# Patient Record
Sex: FEMALE | Race: WHITE | NOT HISPANIC OR LATINO | Employment: OTHER | URBAN - METROPOLITAN AREA
[De-identification: names, ages, dates, MRNs, and addresses within clinical notes are randomized per-mention and may not be internally consistent; named-entity substitution may affect disease eponyms.]

---

## 2017-11-15 ENCOUNTER — OFFICE VISIT (OUTPATIENT)
Dept: URGENT CARE | Facility: PHYSICIAN GROUP | Age: 82
End: 2017-11-15
Payer: MEDICARE

## 2017-11-15 VITALS
TEMPERATURE: 98.6 F | HEIGHT: 59 IN | SYSTOLIC BLOOD PRESSURE: 168 MMHG | RESPIRATION RATE: 14 BRPM | OXYGEN SATURATION: 95 % | DIASTOLIC BLOOD PRESSURE: 90 MMHG | HEART RATE: 100 BPM | WEIGHT: 107 LBS | BODY MASS INDEX: 21.57 KG/M2

## 2017-11-15 DIAGNOSIS — L03.115 CELLULITIS OF RIGHT LOWER LEG: ICD-10-CM

## 2017-11-15 PROCEDURE — 99203 OFFICE O/P NEW LOW 30 MIN: CPT | Performed by: NURSE PRACTITIONER

## 2017-11-15 RX ORDER — CEFTRIAXONE 500 MG/1
500 INJECTION, POWDER, FOR SOLUTION INTRAMUSCULAR; INTRAVENOUS ONCE
Status: COMPLETED | OUTPATIENT
Start: 2017-11-15 | End: 2017-11-15

## 2017-11-15 RX ORDER — CEPHALEXIN 500 MG/1
500 CAPSULE ORAL 4 TIMES DAILY
Qty: 40 QUANTITY SUFFICIENT | Refills: 0 | Status: SHIPPED | OUTPATIENT
Start: 2017-11-15 | End: 2017-12-04

## 2017-11-15 RX ADMIN — CEFTRIAXONE 500 MG: 500 INJECTION, POWDER, FOR SOLUTION INTRAMUSCULAR; INTRAVENOUS at 12:03

## 2017-11-15 ASSESSMENT — ENCOUNTER SYMPTOMS
DIZZINESS: 0
CHILLS: 0
MYALGIAS: 0
FEVER: 0
NAUSEA: 0
VOMITING: 0
HEADACHES: 0

## 2017-11-15 ASSESSMENT — PAIN SCALES - GENERAL: PAINLEVEL: NO PAIN

## 2017-11-15 NOTE — PROGRESS NOTES
"Subjective:      Yokasta Thomason is a 82 y.o. female who presents with Leg Laceration (x 5 weeks, infected)            HPI New problem. 82 year old female presenting with possible infection to wound for approx 5 weeks. She is brought to clinic by her niece who is her primary caregiver. Apparently patient struck her leg on object approx 5 weeks ago and has been treating at home with hydrogen peroxide. She now presents with redness and warmth to area around wound. No discharge. She denies fever, chills, pain, tingling or myalgia. She has no vomiting. She apparently has not seen a doctor in some time.   Patient has no known allergies.  No current outpatient prescriptions on file prior to visit.     No current facility-administered medications on file prior to visit.      Social History     Social History   • Marital status: Single     Spouse name: N/A   • Number of children: N/A   • Years of education: N/A     Occupational History   • Not on file.     Social History Main Topics   • Smoking status: Former Smoker   • Smokeless tobacco: Never Used   • Alcohol use Not on file   • Drug use: Unknown   • Sexual activity: Not on file     Other Topics Concern   • Not on file     Social History Narrative   • No narrative on file     family history is not on file.      Review of Systems   Constitutional: Negative for chills and fever.   Gastrointestinal: Negative for nausea and vomiting.   Musculoskeletal: Negative for myalgias.   Skin:        Open wound lower right leg.   Neurological: Negative for dizziness and headaches.          Objective:     BP (!) 168/90   Pulse 100   Temp 37 °C (98.6 °F)   Resp 14   Ht 1.499 m (4' 11\")   Wt 48.5 kg (107 lb)   SpO2 95%   BMI 21.61 kg/m²      Physical Exam   Constitutional: She is oriented to person, place, and time. She appears well-developed and well-nourished.   Cardiovascular: Normal rate, regular rhythm and normal heart sounds.    No murmur heard.  Pulmonary/Chest: Effort " normal and breath sounds normal.   Musculoskeletal: Normal range of motion.        Right lower leg: She exhibits no tenderness, no swelling and no edema.        Legs:  Neurological: She is alert and oriented to person, place, and time.   Skin: Skin is warm and dry. There is erythema.   She has open wound to anterior right lower leg, midline. No discharge but fairly good area of surrounding erythema extending down to top of foot. Mildly warm to touch.   Psychiatric: She has a normal mood and affect. Her behavior is normal. Thought content normal.               Assessment/Plan:     1. Cellulitis of right lower leg  - cephALEXin (KEFLEX) 500 MG Cap; Take 1 Cap by mouth 4 times a day.  Dispense: 40 Quantity Sufficient; Refill: 0  - cefTRIAXone (ROCEPHIN) injection 500 mg; 500 mg by Intramuscular route Once.  - REFERRAL TO WOUND CLINIC  Referral to wound clinic.  Advised niece and patient on home care until seen by wound care clinic.  Keflex x 10 days.  Rocephin here in clinic.  If symptoms fail to improve in next 4 days or worsen then they are instructed on ED follow up. They have both voiced understanding of all instructions.  I have placed the above orders and discussed them with approved, delegating provider. The MA is performing the above orders under the direction of Dr.J. Arturo MD

## 2017-11-20 ENCOUNTER — TELEPHONE (OUTPATIENT)
Dept: URGENT CARE | Facility: PHYSICIAN GROUP | Age: 82
End: 2017-11-20

## 2017-11-29 ENCOUNTER — OFFICE VISIT (OUTPATIENT)
Dept: URGENT CARE | Facility: PHYSICIAN GROUP | Age: 82
End: 2017-11-29
Payer: MEDICARE

## 2017-11-29 VITALS
SYSTOLIC BLOOD PRESSURE: 144 MMHG | DIASTOLIC BLOOD PRESSURE: 88 MMHG | WEIGHT: 107 LBS | OXYGEN SATURATION: 97 % | BODY MASS INDEX: 21.57 KG/M2 | HEART RATE: 88 BPM | HEIGHT: 59 IN | TEMPERATURE: 97.8 F

## 2017-11-29 DIAGNOSIS — L03.115 CELLULITIS OF RIGHT LOWER EXTREMITY: ICD-10-CM

## 2017-11-29 DIAGNOSIS — S81.801A WOUND OF RIGHT LOWER EXTREMITY, INITIAL ENCOUNTER: ICD-10-CM

## 2017-11-29 PROCEDURE — 99203 OFFICE O/P NEW LOW 30 MIN: CPT | Performed by: PHYSICIAN ASSISTANT

## 2017-11-29 RX ORDER — DOXYCYCLINE HYCLATE 100 MG
100 TABLET ORAL 2 TIMES DAILY
Qty: 14 TAB | Refills: 0 | Status: SHIPPED | OUTPATIENT
Start: 2017-11-29 | End: 2017-12-06

## 2017-11-29 RX ORDER — CEPHALEXIN 500 MG/1
500 CAPSULE ORAL 4 TIMES DAILY
Qty: 28 CAP | Refills: 0 | Status: SHIPPED | OUTPATIENT
Start: 2017-11-29 | End: 2017-12-06

## 2017-11-29 RX ORDER — CEFTRIAXONE SODIUM 250 MG/1
250 INJECTION, POWDER, FOR SOLUTION INTRAMUSCULAR; INTRAVENOUS ONCE
Status: COMPLETED | OUTPATIENT
Start: 2017-11-29 | End: 2017-11-29

## 2017-11-29 RX ADMIN — CEFTRIAXONE SODIUM 250 MG: 250 INJECTION, POWDER, FOR SOLUTION INTRAMUSCULAR; INTRAVENOUS at 13:35

## 2017-11-29 ASSESSMENT — ENCOUNTER SYMPTOMS
DIARRHEA: 0
FEVER: 0
VOMITING: 0
TINGLING: 0
ABDOMINAL PAIN: 0
SHORTNESS OF BREATH: 0
CHILLS: 0
SENSORY CHANGE: 0
NAUSEA: 0
BRUISES/BLEEDS EASILY: 0

## 2017-11-29 NOTE — PROGRESS NOTES
"Subjective:      Yokasta Thomason is a 82 y.o. female who presents with Wound Check (Wound on right lower leg/swelling still not improved )            Wound Check     pt w/ cellulitis to right lower leg, poorly healed laceration, had been on keflex w/ rocephin injection given in clinic, referral was placed for wound care, pt declined this when called about scheduling, RTC today stating: wound not healing well, denies fever/chills or feeling ill. Daughter states she has been very active and on her feet. Denies diarrhea or other complications w/ abx. Notes minimal change in appearance, some improved swelling and general redness.    Review of Systems   Constitutional: Negative for chills and fever.   Respiratory: Negative for shortness of breath.    Cardiovascular: Negative for chest pain.   Gastrointestinal: Negative for abdominal pain, diarrhea, nausea and vomiting.   Musculoskeletal: Negative for joint pain.   Skin: Negative for rash.        POS for wound and cellulitis to right lower ext     Neurological: Negative for tingling and sensory change.   Endo/Heme/Allergies: Does not bruise/bleed easily.       PMH:  has no past medical history on file.  MEDS:   Current Outpatient Prescriptions:   •  cephALEXin (KEFLEX) 500 MG Cap, Take 1 Cap by mouth 4 times a day. (Patient not taking: Reported on 11/29/2017), Disp: 40 Quantity Sufficient, Rfl: 0  ALLERGIES: No Known Allergies  SURGHX: History reviewed. No pertinent surgical history.  SOCHX:  reports that she has quit smoking. She has never used smokeless tobacco.  FH: Family history was reviewed, no pertinent findings to report     Objective:     /88   Pulse 88   Temp 36.6 °C (97.8 °F)   Ht 1.499 m (4' 11\")   Wt 48.5 kg (107 lb)   SpO2 97%   BMI 21.61 kg/m²     Physical Exam   Constitutional: She is oriented to person, place, and time. She appears well-developed and well-nourished. No distress.   HENT:   Head: Normocephalic and atraumatic.   Right Ear: " External ear normal.   Left Ear: External ear normal.   Nose: Nose normal.   Eyes: Conjunctivae and lids are normal. Right eye exhibits no discharge. Left eye exhibits no discharge. No scleral icterus.   Neck: Neck supple.   Pulmonary/Chest: Effort normal. No respiratory distress.   Musculoskeletal: Normal range of motion.        Legs:  Lines drawn to indicate border of erythema   Neurological: She is alert and oriented to person, place, and time. She is not disoriented.   Skin: Skin is warm and dry. She is not diaphoretic. No erythema. No pallor.   Psychiatric: Her speech is normal and behavior is normal.   Nursing note and vitals reviewed.        rocephin 250 - tolerates well     Assessment/Plan:     1. Cellulitis of right lower extremity  Supportive care is reviewed with patient/caregiver - recommend to push PO fluids and electrolytes,  take full course of Rx, take with probiotics, observe for resolution  Return to clinic with lack of resolution or progression of symptoms.  F/u w/ wound care  Stress probiotics w/ abx's - ER precautions with any worsening symptoms are reviewed with patient/caregiver and they do express understanding  F/u w/ PCP    - cefTRIAXone (ROCEPHIN) injection 250 mg; 250 mg by Intramuscular route Once.  - doxycycline (VIBRAMYCIN) 100 MG Tab; Take 1 Tab by mouth 2 times a day for 7 days.  Dispense: 14 Tab; Refill: 0  - cephALEXin (KEFLEX) 500 MG Cap; Take 1 Cap by mouth 4 times a day for 7 days.  Dispense: 28 Cap; Refill: 0  - REFERRAL TO WOUND CLINIC    2. Wound of right lower extremity, initial encounter    - cefTRIAXone (ROCEPHIN) injection 250 mg; 250 mg by Intramuscular route Once.  - REFERRAL TO WOUND CLINIC

## 2017-12-04 ENCOUNTER — NON-PROVIDER VISIT (OUTPATIENT)
Dept: WOUND CARE | Facility: MEDICAL CENTER | Age: 82
End: 2017-12-04
Attending: PHYSICIAN ASSISTANT
Payer: MEDICARE

## 2017-12-04 PROCEDURE — 97597 DBRDMT OPN WND 1ST 20 CM/<: CPT

## 2017-12-04 PROCEDURE — 99203 OFFICE O/P NEW LOW 30 MIN: CPT

## 2017-12-05 NOTE — CERTIFICATION
Advanced Wound Care  [facility]  [address]  [address]  [place] [id number]  [phone] Fax: [phone]      Initial Evaluation  For Certification Period: 12/04/17 - 02/23/17      Referring Physician: BERTHA Churchill  Primary Physician: None        Consulting Physicians:         Wound(s): Right anterior LE  Start of Care: 12/04/17       Subjective:        HPI: 81 y/o female with minimal medical hx was picking up a metal chair to vacuum underneath it and dropped the chair on her shin.  This incident happened mid to late October 2017.  She has been to urgent care several times, on several abx orally for cellulitis and topical polysporin.                    Pain: Pt denies           Past Medical History:  No past medical history on file.    Current Medications:    Current Outpatient Prescriptions:   •  doxycycline (VIBRAMYCIN) 100 MG Tab, Take 1 Tab by mouth 2 times a day for 7 days., Disp: 14 Tab, Rfl: 0  •  cephALEXin (KEFLEX) 500 MG Cap, Take 1 Cap by mouth 4 times a day for 7 days., Disp: 28 Cap, Rfl: 0    Allergies:   Patient has no known allergies.    Past Surgical History:    No past surgical history on file.  Pt reports Left hip surgery 4 years ago after a fall.    Social History:  Pt states she smoked 1/2ppd for 50+ years and quit 5 years ago.  Social History     Social History   • Marital status: Single     Spouse name: N/A   • Number of children: N/A   • Years of education: N/A     Occupational History   • Not on file.     Social History Main Topics   • Smoking status: Former Smoker   • Smokeless tobacco: Never Used   • Alcohol use Not on file   • Drug use: Unknown   • Sexual activity: Not on file     Other Topics Concern   • Not on file     Social History Narrative   • No narrative on file           Objective:      Tests and Measures:  12/04/17: DP/PT pulses multiphasic per doppler.     Left leg Right leg    164    176   Brachial 194 190   ARBEN 0.99 0.91        Orthotic, protective, supportive devices: FWW    Fall Risk Assessment (freda all that apply with an X): Completed 12/04/17: Pt is a high fall risk.              X 65 years or older                X Fall within the last 2 years, uses   X Ambulatory devices   Loss of protective sensation in feet,    Use of prostethic/orthotic, years               Presence of lower extremity/foot/toe amputation              Taking medication that increases risk (per facility policy)       Wound Characteristics                                                    Location: Right anterior LE   Initial Evaluation  Date: 12/04/17   Tissue Type and %: 50% red moist tissue and 50% BRADY (macerated skin, old blood, adipose boggy area)   Periwound: Mild maceration   Drainage: Mod serous   Exposed structures adipose   Wound Edges:   open   Odor: none   S&S of Infection:   Receding cellulitis per sharpie line drawn at urgent care   Edema: 2+ pitting to RLE   Sensation: intact               Measurements: Right anterior LE Initial Evaluation  Date: 12/04/17   Length (cm) 2.4   Width (cm) 1.9   Depth (cm) BRADY   Area (cm2) 4.56cm2   Tract/undermine BRADY, none appreciated today            Procedures:     Debridement :  Selective using scalpel to remove ~3cm2 dried drainage and ~1cm2 bloody, macerated tissue mix from wound bed   Cleansed with: NS/gauze                                                                         Periwound protected with: no sting skin prep, zinc paste   Primary dressing: aquacel ag   Secondary Dressing: silicone border dressing   Other: Tubi D     Patient Education: POC reviewed with pt.  Plan for twice weekly visits taught and need to keep dsng CDI in between AWC appts.  S/sx advancing infection reviewed with pt.  She is currently on oral abx and I advised her to continue them as prescribed.  Fall risk taught: use of walker as needed, removal of rugs at home, etc.  Nutrition for wound healing reviewed: increased protein  and plenty of fruits and vegetables.  Pt and niece express understanding of instructions.     Professional Collaboration: Initial eval sent to referring provider via EPIC.      Assessment:      Wound etiology: trauma, infection    Wound Progress:  Initial visit    Rationale for Treatment: AqAg to manage bioburden, absorb exudate, and maintain moist wound environment without laterally wicking exudate therefore reducing cynthia-wound maceration.  Tubigrip for management of edema.    Patient tolerance/compliance: Pt and niece agreeable with poc, asking pertinent questions, involved in visit.    Complicating factors: age, infection    Need for ongoing Advanced Wound Care services: continued skilled wound care for debridement as needed, dressing management and skilled clinical observation to prevent complications and expedite healing.    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  The dressings or modalities may need to be adapted   from the original plan to address any significant changes in the wound environment.          Clinician Signature:_______________________________Date__________________      Physician Signature:______________________________Date:__________________

## 2017-12-07 ENCOUNTER — NON-PROVIDER VISIT (OUTPATIENT)
Dept: WOUND CARE | Facility: MEDICAL CENTER | Age: 82
End: 2017-12-07
Attending: PHYSICIAN ASSISTANT
Payer: MEDICARE

## 2017-12-07 PROCEDURE — 97597 DBRDMT OPN WND 1ST 20 CM/<: CPT

## 2017-12-07 NOTE — WOUND TEAM
Advanced Wound Care  [facility]  [address]  [address]  [place] [id number]  [phone] Fax: [phone]      Encounter Note  For Certification Period: 12/04/17 - 02/23/17      Referring Physician: BERTHA Churchill  Primary Physician: None        Consulting Physicians:         Wound(s): Right anterior LE  Start of Care: 12/04/17       Subjective:        HPI: 83 y/o female with minimal medical hx was picking up a metal chair to vacuum underneath it and dropped the chair on her shin.  This incident happened mid to late October 2017.  She has been to urgent care several times, on several abx orally for cellulitis and topical polysporin.                    Pain: Pt denies         Allergies:   Patient has no known allergies.        Objective:      Tests and Measures:  12/04/17: DP/PT pulses multiphasic per doppler.     Left leg Right leg    164    176   Brachial 194 190   ARBEN 0.99 0.91       Orthotic, protective, supportive devices: FWW    Fall Risk Assessment (freda all that apply with an X): Completed 12/04/17: Pt is a high fall risk.            Wound Characteristics                                                    Location: Right anterior LE   Initial Evaluation  Date: 12/04/17 Encounter Date: 12/07/2017   Tissue Type and %: 50% red moist tissue and 50% BRADY (macerated skin, old blood, adipose boggy area) 80% moist red, 20% adherent yellow   Periwound: Mild maceration Intact, erythema   Drainage: Mod serous Moderate serosanguinous    Exposed structures adipose Small adipose   Wound Edges:   open Attached, open   Odor: none none   S&S of Infection:   Receding cellulitis per sharpie line drawn at urgent care Erythema, patient is taking abx   Edema: 2+ pitting to RLE 2+ pitting   Sensation: intact intact               Measurements: Right anterior LE Initial Evaluation  Date: 12/04/17   Length (cm) 2.4   Width (cm) 1.9   Depth (cm) BRADY   Area (cm2) 4.56cm2   Tract/undermine BRADY, none  "appreciated today              Procedures:     Debridement : CSWD with curette to remove ~2.0cm2 adherent yellow    Cleansed with: Normal saline and gauze                                                                         Periwound protected with: no sting skin prep   Primary dressing: Aquacel Ag   Secondary Dressing: silicone border dressing   Other: Tubigrib size D     Patient Education: Patient is still on abx, patient states \"I just have 1 more left for today.\"  Patient advised to elevate her legs when possible and to keep the compression stocking on as much as possible.  Patient to take off tubigrib if it starts to cut into her circulation.    Professional Collaboration: None      Assessment:      Wound etiology: trauma, infection    Wound Progress:  Tissue quality has improved    Rationale for Treatment: AqAg to manage bioburden, absorb exudate, and maintain moist wound environment without laterally wicking exudate therefore reducing cynthia-wound maceration.  Tubigrip for management of edema.    Patient tolerance/compliance: Pt and niece agreeable with poc, asking pertinent questions, involved in visit.    Complicating factors: age, infection    Need for ongoing Advanced Wound Care services: continued skilled wound care for debridement as needed, dressing management and skilled clinical observation to prevent complications and expedite healing.    Plan:      Treatment Plan and Recommendations:  Diagnosis/ICD10:  L03.115 (cellulitis RLE); L97.912 (non pressure chronic ulcer right leg with fat layer exposed)     Procedures/CPT: Selective debridement 68904    Frequency: 2x/week      Treatment Goals: STG 2 Weeks  LTG 4 Weeks   Granulation Tissue: 100% 100%   Decrease Necrotic Tissue to: 0% 0%   Wound Phase:  proliferation proliferation   Decrease Size by: 30% 75%   Periwound:  Remains intact Remains intact   Decrease tracts/undermining by: n/a n/a   Decrease Pain:  n/a n/a       At the time of each visit a " thorough assessment of the patient is completed to assure the  appropriateness of our plan of care.  The dressings or modalities may need to be adapted   from the original plan to address any significant changes in the wound environment.          Clinician Signature:_______________________________Date__________________      Physician Signature:______________________________Date:__________________

## 2017-12-12 ENCOUNTER — NON-PROVIDER VISIT (OUTPATIENT)
Dept: WOUND CARE | Facility: MEDICAL CENTER | Age: 82
End: 2017-12-12
Attending: PHYSICIAN ASSISTANT
Payer: MEDICARE

## 2017-12-12 PROCEDURE — 97597 DBRDMT OPN WND 1ST 20 CM/<: CPT

## 2017-12-12 NOTE — WOUND TEAM
Advanced Wound Care  [facility]  [address]  [address]  [place] [id number]  [phone] Fax: [phone]      Encounter Note  For Certification Period: 12/04/17 - 02/23/17      Referring Physician: BERTHA Churchill  Primary Physician: None        Consulting Physicians:         Wound(s): Right anterior LE  Start of Care: 12/04/17       Subjective:        HPI: 83 y/o female with minimal medical hx was picking up a metal chair to vacuum underneath it and dropped the chair on her shin.  This incident happened mid to late October 2017.  She has been to urgent care several times, on several abx orally for cellulitis and topical polysporin.                    Pain: Pt denies           Allergies:   Patient has no known allergies.        Objective:      Tests and Measures:  12/04/17: DP/PT pulses multiphasic per doppler.     Left leg Right leg    164    176   Brachial 194 190   ARBEN 0.99 0.91       Orthotic, protective, supportive devices: FWW    Fall Risk Assessment (freda all that apply with an X): Completed 12/04/17: Pt is a high fall risk.            Wound Characteristics                                                    Location: Right anterior LE   Initial Evaluation  Date: 12/04/17 Encounter Date: 12/12/2017   Tissue Type and %: 50% red moist tissue and 50% BRADY (macerated skin, old blood, adipose boggy area) 100% dark red viable, contracted into 2 separate sites.   Periwound: Mild maceration Intact, slight erythema   Drainage: Mod serous Minimal serosanguinous    Exposed structures adipose Small adipose   Wound Edges:   open Attached, open   Odor: none none   S&S of Infection:   Receding cellulitis per sharpie line drawn at urgent care Slight erythema   Edema: 2+ pitting to RLE 2+ pitting   Sensation: intact intact               Measurements: Right anterior LE Initial Evaluation  Date: 12/04/17 Encounter Date: 12/12/2017  Medial / Lateral    Length (cm) 2.4 0.9 / 0.6   Width (cm)  1.9 0.2 / 0.6   Depth (cm) BRADY 0.2 / 0.2   Area (cm2) 4.56cm2 0.18 / 0.36cm2   Tract/undermine BRADY, none appreciated today None              Procedures:     Debridement : CSWD with curette to remove ~2.0cm2 adherent yellow slough from wound bed and cynthia-wound callus   Cleansed with: Normal saline and gauze                                                                         Periwound protected with: no sting skin prep   Primary dressing: Aquacel Ag   Secondary Dressing: silicone border dressing secured with hypafix tape   Other: Tubigrib size E     Patient Education: POC discussed with patient, patient's wound decreased in size and more viable tissue    Professional Collaboration: None      Assessment:      Wound etiology: trauma, infection    Wound Progress:  Tissue quality has improved, wound had contracted and decreased into 2 smaller sites.    Rationale for Treatment: AqAg to manage bioburden, absorb exudate, and maintain moist wound environment without laterally wicking exudate therefore reducing cynthia-wound maceration.  Tubigrip for management of edema.    Patient tolerance/compliance: Pt and niece agreeable with poc, asking pertinent questions, involved in visit.    Complicating factors: age, infection    Need for ongoing Advanced Wound Care services: continued skilled wound care for debridement as needed, dressing management and skilled clinical observation to prevent complications and expedite healing.    Plan:      Treatment Plan and Recommendations:  Diagnosis/ICD10:  L03.115 (cellulitis RLE); L97.912 (non pressure chronic ulcer right leg with fat layer exposed)     Procedures/CPT: Selective debridement 47184    Frequency: 2x/week      Treatment Goals: STG 2 Weeks  LTG 4 Weeks   Granulation Tissue: 100% 100%   Decrease Necrotic Tissue to: 0% 0%   Wound Phase:  proliferation proliferation   Decrease Size by: 30% 75%   Periwound:  Remains intact Remains intact   Decrease tracts/undermining by: n/a n/a    Decrease Pain:  n/a n/a       At the time of each visit a thorough assessment of the patient is completed to assure the  appropriateness of our plan of care.  The dressings or modalities may need to be adapted   from the original plan to address any significant changes in the wound environment.          Clinician Signature:_______________________________Date__________________      Physician Signature:______________________________Date:__________________

## 2017-12-18 ENCOUNTER — NON-PROVIDER VISIT (OUTPATIENT)
Dept: WOUND CARE | Facility: MEDICAL CENTER | Age: 82
End: 2017-12-18
Attending: PHYSICIAN ASSISTANT
Payer: MEDICARE

## 2017-12-18 DIAGNOSIS — B35.1 ONYCHOMYCOSIS: ICD-10-CM

## 2017-12-18 PROCEDURE — 97597 DBRDMT OPN WND 1ST 20 CM/<: CPT

## 2017-12-18 NOTE — WOUND TEAM
Advanced Wound Care  [facility]  [address]  [address]  [place] [id number]  [phone] Fax: [phone]      Encounter Note  For Certification Period: 12/04/17 - 02/23/17      Referring Physician: BERTHA Churchill  Primary Physician: None        Consulting Physicians:         Wound(s): Right anterior LE  Start of Care: 12/04/17       Subjective:        HPI: 83 y/o female with minimal medical hx was picking up a metal chair to vacuum underneath it and dropped the chair on her shin.  This incident happened mid to late October 2017.  She has been to urgent care several times, on several abx orally for cellulitis and topical polysporin.                    Pain: Pt denies           Allergies:   Patient has no known allergies.        Objective:      Tests and Measures:  12/04/17: DP/PT pulses multiphasic per doppler.     Left leg Right leg    164    176   Brachial 194 190   ARBEN 0.99 0.91       Orthotic, protective, supportive devices: FWW    Fall Risk Assessment (freda all that apply with an X): Completed 12/04/17: Pt is a high fall risk.            Wound Characteristics                                                    Location: Right anterior LE   Initial Evaluation  Date: 12/04/17 Encounter Date: 12/12/2017 Encounter Note: 12/18/2017   Tissue Type and %: 50% red moist tissue and 50% BRADY (macerated skin, old blood, adipose boggy area) 100% dark red viable, contracted into 2 separate sites. 100% dark red viable. Lateral wound is superficial scab   Periwound: Mild maceration Intact, slight erythema Intact, slight erythema   Drainage: Mod serous Minimal serosanguinous  Scant ss   Exposed structures adipose Small adipose adipose   Wound Edges:   Open Attached, open Attached, open   Odor: None None none   S&S of Infection:   Receding cellulitis per sharpie line drawn at urgent care Slight erythema erythema   Edema: 2+ pitting to RLE 2+ pitting 2+   Sensation: intact intact intact                Measurements: Right anterior LE Initial Evaluation  Date: 12/04/17 Encounter Date: 12/12/2017  Medial / Lateral  Encounter Note: 12/18/2017  medial   Length (cm) 2.4 0.9 / 0.6 0.5   Width (cm) 1.9 0.2 / 0.6 0.5   Depth (cm) BRADY 0.2 / 0.2 0.1   Area (cm2) 4.56cm2 0.18 / 0.36cm2 0.25   Tract/undermine BRADY, none appreciated today None none                  Procedures:     Debridement : CSWD with curette to remove <0.5 cm2 cm2 adherent yellow slough from wound bed and cynthia-wound callus   Cleansed with: Normal saline and gauze                                                                         Periwound protected with: no sting skin prep   Primary dressing: Aquacel Ag moistened with NS   Secondary Dressing: silicone border dressing   Other: Tubigrib size E     Patient Education: Instructed pt on s/s infection - chills, fever, malaise, NV, increased redness/swelling/pain/exudate - and to go to ER/Urgent Care; on wound progress, on rationale for dressing selection; on rationale for foot and nail care (pt wound like referral); to return 2x/week for appointments, she may schedule foot and nail eval at next appointment. Pt and niece verbalized understanding.     Professional Collaboration: DANICA Mullins, order for foot and nail      Assessment:      Wound etiology: trauma, infection    Wound Progress:  Tissue quality has improved, wound had contracted and decreased into 2 smaller sites.    Rationale for Treatment: AqAg to manage bioburden, absorb exudate, and maintain moist wound environment without laterally wicking exudate therefore reducing cynthia-wound maceration.  Tubigrip for management of edema.    Patient tolerance/compliance: Pt and niece agreeable with poc, asking pertinent questions, involved in visit.    Complicating factors: age, infection    Need for ongoing Advanced Wound Care services: continued skilled wound care for debridement as needed, dressing management and skilled clinical observation to  prevent complications and expedite healing.    Plan:      Treatment Plan and Recommendations:  Diagnosis/ICD10:  L03.115 (cellulitis RLE); L97.912 (non pressure chronic ulcer right leg with fat layer exposed)     Procedures/CPT: Selective debridement 48890    Frequency: 2x/week      Treatment Goals: STG 2 Weeks  LTG 4 Weeks   Granulation Tissue: 100% 100%   Decrease Necrotic Tissue to: 0% 0%   Wound Phase:  proliferation proliferation   Decrease Size by: 30% 75%   Periwound:  Remains intact Remains intact   Decrease tracts/undermining by: n/a n/a   Decrease Pain:  n/a n/a       At the time of each visit a thorough assessment of the patient is completed to assure the  appropriateness of our plan of care.  The dressings or modalities may need to be adapted   from the original plan to address any significant changes in the wound environment.          Clinician Signature:_______________________________Date__________________      Physician Signature:______________________________Date:__________________

## 2017-12-21 ENCOUNTER — NON-PROVIDER VISIT (OUTPATIENT)
Dept: WOUND CARE | Facility: MEDICAL CENTER | Age: 82
End: 2017-12-21
Attending: PHYSICIAN ASSISTANT
Payer: MEDICARE

## 2017-12-21 PROCEDURE — 97597 DBRDMT OPN WND 1ST 20 CM/<: CPT

## 2017-12-22 NOTE — WOUND TEAM
Advanced Wound Care  [facility]  [address]  [address]  [place] [id number]  [phone] Fax: [phone]      Encounter Note  For Certification Period: 12/04/17 - 02/23/17      Referring Physician: BERTHA Churchill  Primary Physician: None        Consulting Physicians:         Wound(s): Right anterior LE  Start of Care: 12/04/17       Subjective:        HPI: 81 y/o female with minimal medical hx was picking up a metal chair to vacuum underneath it and dropped the chair on her shin.  This incident happened mid to late October 2017.  She has been to urgent care several times, on several abx orally for cellulitis and topical polysporin.                    Pain: Pt denies           Allergies:   Patient has no known allergies.        Objective:      Tests and Measures:  12/04/17: DP/PT pulses multiphasic per doppler.     Left leg Right leg    164    176   Brachial 194 190   ARBEN 0.99 0.91       Orthotic, protective, supportive devices: FWW    Fall Risk Assessment (freda all that apply with an X): Completed 12/04/17: Pt is a high fall risk.            Wound Characteristics                                                    Location: Right anterior LE   Initial Evaluation  Date: 12/04/17 Encounter Date: 12/12/2017 Encounter Note: 12/21/2017   Tissue Type and %: 50% red moist tissue and 50% BRADY (macerated skin, old blood, adipose boggy area) 100% dark red viable, contracted into 2 separate sites. 100% dark red viable. Lateral wound is superficial scab   Periwound: Mild maceration Intact, slight erythema Intact, slight skin irritation   Drainage: Mod serous Minimal serosanguinous  Scant ss   Exposed structures adipose Small adipose adipose   Wound Edges:   Open Attached, open Attached, open   Odor: None None none   S&S of Infection:   Receding cellulitis per sharpie line drawn at urgent care Slight erythema erythema   Edema: 2+ pitting to RLE 2+ pitting 2+   Sensation: intact intact intact                Measurements: Right anterior LE Initial Evaluation  Date: 12/04/17 Encounter Date: 12/12/2017  Medial / Lateral  Encounter Note: 12/18/2017  medial   Length (cm) 2.4 0.9 / 0.6 0.5   Width (cm) 1.9 0.2 / 0.6 0.5   Depth (cm) BRADY 0.2 / 0.2 0.1   Area (cm2) 4.56cm2 0.18 / 0.36cm2 0.25   Tract/undermine BRADY, none appreciated today None none                  Procedures:     Debridement : CSWD with scalpel to remove <0.5 cm2 cm2 adherent yellow slough from wound bed and cynthia-wound callus   Cleansed with: Normal saline and gauze                                                                         Periwound protected with: no sting skin prep, TAC on irritated area of skin   Primary dressing: Aquacel Ag moistened with NS   Secondary Dressing: Non-adhesive foam   Other: No latex tubigrib size D     Patient Education: Patient had some skin irritation from the silicone adhesive and along her leg.  Patient unsure that she is allergic to latex. Trial of non-latex tubigrib and no silicone adhesive.     Professional Collaboration:       Assessment:      Wound etiology: trauma, infection    Wound Progress:  Tissue quality has improved, wound had contracted and decreased into 2 smaller sites.    Rationale for Treatment: AqAg to manage bioburden, absorb exudate, and maintain moist wound environment without laterally wicking exudate therefore reducing cynthia-wound maceration.  Tubigrip for management of edema.    Patient tolerance/compliance: Pt and niece agreeable with poc, asking pertinent questions, involved in visit.    Complicating factors: age, infection    Need for ongoing Advanced Wound Care services: continued skilled wound care for debridement as needed, dressing management and skilled clinical observation to prevent complications and expedite healing.    Plan:      Treatment Plan and Recommendations:  Diagnosis/ICD10:  L03.115 (cellulitis RLE); L97.912 (non pressure chronic ulcer right leg with fat layer  exposed)     Procedures/CPT: Selective debridement 03752    Frequency: 2x/week      Treatment Goals: STG 2 Weeks  LTG 4 Weeks   Granulation Tissue: 100% 100%   Decrease Necrotic Tissue to: 0% 0%   Wound Phase:  proliferation proliferation   Decrease Size by: 30% 75%   Periwound:  Remains intact Remains intact   Decrease tracts/undermining by: n/a n/a   Decrease Pain:  n/a n/a       At the time of each visit a thorough assessment of the patient is completed to assure the  appropriateness of our plan of care.  The dressings or modalities may need to be adapted   from the original plan to address any significant changes in the wound environment.          Clinician Signature:_______________________________Date__________________      Physician Signature:______________________________Date:__________________

## 2017-12-27 ENCOUNTER — NON-PROVIDER VISIT (OUTPATIENT)
Dept: WOUND CARE | Facility: MEDICAL CENTER | Age: 82
End: 2017-12-27
Attending: PHYSICIAN ASSISTANT
Payer: MEDICARE

## 2017-12-27 PROCEDURE — 97602 WOUND(S) CARE NON-SELECTIVE: CPT

## 2017-12-28 NOTE — WOUND TEAM
Advanced Wound Care  [facility]  [address]  [address]  [place] [address]  [phone] Fax: [phone]      Encounter Note  For Certification Period: 12/04/17 - 02/23/17      Referring Physician: BERTHA Churchill  Primary Physician: None        Consulting Physicians:         Wound(s): Right anterior LE  Start of Care: 12/04/17       Subjective:        HPI: 83 y/o female with minimal medical hx was picking up a metal chair to vacuum underneath it and dropped the chair on her shin.  This incident happened mid to late October 2017.  She has been to urgent care several times, on several abx orally for cellulitis and topical polysporin.                    Pain: Pt denies           Allergies:   Patient has no known allergies.        Objective:      Tests and Measures:  12/04/17: DP/PT pulses multiphasic per doppler.     Left leg Right leg    164    176   Brachial 194 190   ARBEN 0.99 0.91       Orthotic, protective, supportive devices: FWW    Fall Risk Assessment (freda all that apply with an X): Completed 12/04/17: Pt is a high fall risk.            Wound Characteristics                                                    Location: Right anterior LE   Initial Evaluation  Date: 12/04/17 Encounter Date: 12/12/2017 Encounter Note: 12/27/2017   Tissue Type and %: 50% red moist tissue and 50% BRADY (macerated skin, old blood, adipose boggy area) 100% dark red viable, contracted into 2 separate sites. 100% moist red viable   Periwound: Mild maceration Intact, slight erythema Scant resolving skin irritation   Drainage: Mod serous Minimal serosanguinous  Scant ss   Exposed structures adipose Small adipose none   Wound Edges:   Open Attached, open open   Odor: None None none   S&S of Infection:   Receding cellulitis per sharpie line drawn at urgent care Slight erythema none   Edema: 2+ pitting to RLE 2+ pitting 1+   Sensation: intact intact intact               Measurements: Right anterior LE  Initial Evaluation  Date: 12/04/17 Encounter Date: 12/12/2017  Medial / Lateral  Encounter Note: 12/27/2017   Length (cm) 2.4 0.9 / 0.6 0.1   Width (cm) 1.9 0.2 / 0.6 0.1   Depth (cm) BRADY 0.2 / 0.2 0.1   Area (cm2) 4.56cm2 0.18 / 0.36cm2 0.01   Tract/undermine BRADY, none appreciated today None none            Procedures:     Debridement : non-selective with moist gauze to remove dried crust & flaky non-viable tissue to reveal small pinpoint opening   Cleansed with: Normal saline and gauze; foot & leg washed with soapy washcloth                                                                         Periwound protected with: TAC; lotion to foot & leg   Primary dressing: hydrogel & moistened Aquacel Ag   Secondary Dressing: Non-adhesive foam   Other: No latex tubigrib size D single layer     Patient Education: Discussed plan of care including rationale for dressing selection. How to don stocking and when to remove. Keep dressing dry & intact.    Professional Collaboration: none      Assessment:      Wound etiology: trauma, infection    Wound Progress: decreased size & improved periwound skin    Rationale for Treatment: AqAg to manage bioburden, Tubigrip for management of edema.    Patient tolerance/compliance: Pt agreeable with poc, asking pertinent questions, involved in visit.    Complicating factors: age, infection    Need for ongoing Advanced Wound Care services: continued skilled wound care for debridement as needed, dressing management and skilled clinical observation to prevent complications and expedite healing.    Plan:      Treatment Plan and Recommendations:  Diagnosis/ICD10:  L03.115 (cellulitis RLE); L97.912 (non pressure chronic ulcer right leg with fat layer exposed)     Procedures/CPT: Selective debridement 81056    Frequency: 1x/week      Treatment Goals: STG 2 Weeks  LTG 4 Weeks   Granulation Tissue: 100% 100%   Decrease Necrotic Tissue to: 0% 0%   Wound Phase:  proliferation proliferation   Decrease  Size by: 30% 75%   Periwound:  Remains intact Remains intact   Decrease tracts/undermining by: n/a n/a   Decrease Pain:  n/a n/a       At the time of each visit a thorough assessment of the patient is completed to assure the  appropriateness of our plan of care.  The dressings or modalities may need to be adapted   from the original plan to address any significant changes in the wound environment.          Clinician Signature:_______________________________Date__________________      Physician Signature:______________________________Date:__________________

## 2018-01-05 ENCOUNTER — NON-PROVIDER VISIT (OUTPATIENT)
Dept: WOUND CARE | Facility: MEDICAL CENTER | Age: 83
End: 2018-01-05
Attending: PHYSICIAN ASSISTANT
Payer: MEDICARE

## 2018-01-05 PROCEDURE — 97602 WOUND(S) CARE NON-SELECTIVE: CPT

## 2018-01-05 NOTE — WOUND TEAM
Advanced Wound Care  [facility]  [address]  [address]  [place] [address]  [phone] Fax: [phone]      Encounter Note  For Certification Period: 12/04/17 - 02/23/17      Referring Physician: BERTHA Churchill  Primary Physician: None        Consulting Physicians:         Wound(s): Right anterior LE  Start of Care: 12/04/17       Subjective:        HPI: 83 y/o female with minimal medical hx was picking up a metal chair to vacuum underneath it and dropped the chair on her shin.  This incident happened mid to late October 2017.  She has been to urgent care several times, on several abx orally for cellulitis and topical polysporin.                    Pain: Pt denies           Allergies:   Patient has no known allergies.        Objective:      Tests and Measures:  12/04/17: DP/PT pulses multiphasic per doppler.     Left leg Right leg    164    176   Brachial 194 190   ARBEN 0.99 0.91       Orthotic, protective, supportive devices: FWW    Fall Risk Assessment (freda all that apply with an X): Completed 12/04/17: Pt is a high fall risk.            Wound Characteristics                                                    Location: Right anterior LE   Initial Evaluation  Date: 12/04/17 Encounter Date: 12/12/2017 Encounter Note: 1/5/18   Tissue Type and %: 50% red moist tissue and 50% BRADY (macerated skin, old blood, adipose boggy area) 100% dark red viable, contracted into 2 separate sites. 100% pink   Periwound: Mild maceration Intact, slight erythema Red irritation, small excoriated areas, maceration at wound edges   Drainage: Mod serous Minimal serosanguinous  Minimal ss   Exposed structures adipose Small adipose none   Wound Edges:   Open Attached, open open   Odor: None None none   S&S of Infection:   Receding cellulitis per sharpie line drawn at urgent care Slight erythema none   Edema: 2+ pitting to RLE 2+ pitting 2+   Sensation: intact intact intact               Measurements:  Right anterior LE Initial Evaluation  Date: 12/04/17 Encounter Date: 12/12/2017  Medial / Lateral  Encounter Note: 1/5/18   Length (cm) 2.4 0.9 / 0.6 0.1   Width (cm) 1.9 0.2 / 0.6 0.1   Depth (cm) BRADY 0.2 / 0.2 0.1   Area (cm2) 4.56cm2 0.18 / 0.36cm2 0.01   Tract/undermine BRADY, none appreciated today None none            Procedures:     Debridement : non-selective with moist gauze to remove macerated non-viable nonadherent tissue at periwound   Cleansed with: Normal saline and gauze; foot & leg washed with soapy washcloth                                                                         Periwound protected with: TAC   Primary dressing: Brittanie   Secondary Dressing: mepitel non-adhesive foam   Other: No latex tubigrib size D single layer     Patient Education: Discussed plan of care including rationale for dressing selection. How to don stocking and when to remove. Keep dressing dry & intact. Options for itching at periwound & topical lotion to apply; importance of not scratching periwound as causing denuded areas. Patient & niece verbalize understanding to all education.     Professional Collaboration: none      Assessment:      Wound etiology: trauma, infection    Wound Progress: decreased size but increase periwound redness    Rationale for Treatment: Brittanie for scaffolding & antimicrobial , Tubigrip for management of edema.    Patient tolerance/compliance: Pt agreeable with poc, asking pertinent questions, involved in visit.    Complicating factors: age, infection    Need for ongoing Advanced Wound Care services: continued skilled wound care for debridement as needed, dressing management and skilled clinical observation to prevent complications and expedite healing.    Plan:      Treatment Plan and Recommendations:  Diagnosis/ICD10:  L03.115 (cellulitis RLE); L97.912 (non pressure chronic ulcer right leg with fat layer exposed)     Procedures/CPT: Selective debridement 90532    Frequency:  1x/week      Treatment Goals: STG 2 Weeks  LTG 4 Weeks   Granulation Tissue: 100% 100%   Decrease Necrotic Tissue to: 0% 0%   Wound Phase:  proliferation proliferation   Decrease Size by: 30% 75%   Periwound:  Remains intact Remains intact   Decrease tracts/undermining by: n/a n/a   Decrease Pain:  n/a n/a       At the time of each visit a thorough assessment of the patient is completed to assure the  appropriateness of our plan of care.  The dressings or modalities may need to be adapted   from the original plan to address any significant changes in the wound environment.          Clinician Signature:_______________________________Date__________________      Physician Signature:______________________________Date:__________________

## 2018-01-09 ENCOUNTER — NON-PROVIDER VISIT (OUTPATIENT)
Dept: WOUND CARE | Facility: MEDICAL CENTER | Age: 83
End: 2018-01-09
Attending: PHYSICIAN ASSISTANT
Payer: MEDICARE

## 2018-01-09 PROCEDURE — 97602 WOUND(S) CARE NON-SELECTIVE: CPT

## 2018-01-09 NOTE — WOUND TEAM
Advanced Wound Care  [facility]  [address]  [address]  [place] [address]  [phone] Fax: [phone]      Encounter Note  For Certification Period: 12/04/17 - 02/23/17      Referring Physician: BERTHA Churchill  Primary Physician: None        Consulting Physicians:         Wound(s): Right anterior LE  Start of Care: 12/04/17       Subjective:        HPI: 81 y/o female with minimal medical hx was picking up a metal chair to vacuum underneath it and dropped the chair on her shin.  This incident happened mid to late October 2017.  She has been to urgent care several times, on several abx orally for cellulitis and topical polysporin.                    Pain: Pt denies           Allergies:   Patient has no known allergies.        Objective:      Tests and Measures:  12/04/17: DP/PT pulses multiphasic per doppler.     Left leg Right leg    164    176   Brachial 194 190   ARBEN 0.99 0.91       Orthotic, protective, supportive devices: FWW    Fall Risk Assessment (freda all that apply with an X): Completed 12/04/17: Pt is a high fall risk.            Wound Characteristics                                                    Location: Right anterior LE   Initial Evaluation  Date: 12/04/17 Encounter Note: 1/5/18 Encounter Note: 1/9/18   Tissue Type and %: 50% red moist tissue and 50% BRADY (macerated skin, old blood, adipose boggy area) 100% pink resolved   Periwound: Mild maceration Red irritation, small excoriated areas, maceration at wound edges    Drainage: Mod serous Minimal ss    Exposed structures adipose none    Wound Edges:   Open open    Odor: None none    S&S of Infection:   Receding cellulitis per sharpie line drawn at urgent care none    Edema: 2+ pitting to RLE 2+    Sensation: intact intact                Measurements: Right anterior LE Initial Evaluation  Date: 12/04/17 Encounter Date: 12/12/2017  Medial / Lateral  Encounter Note: 1/5/18   Length (cm) 2.4 0.9 / 0.6 0.1    Width (cm) 1.9 0.2 / 0.6 0.1   Depth (cm) BRADY 0.2 / 0.2 0.1   Area (cm2) 4.56cm2 0.18 / 0.36cm2 0.01   Tract/undermine BRADY, none appreciated today None none                Procedures:     Debridement : non-selective with moist gauze to remove macerated non-viable nonadherent tissue - no wound revealed   Cleansed with: Normal saline and gauze                                                                   Periwound protected with:    Primary dressing: Med Biatin   Secondary Dressing:    Other: Pt refused No latex tubigrib size D single layer     Patient Education: Instructed pt and daughter that wound is resolved, but fragile, that tissue may take up to a year to get to 80% of tensile strength, will never be as strong as undamaged tissue; to return in two weeks for skin check. Pt stated that she has own compression stockings, will try them and let clinician know at 2 week check if she would like to order Jobst; pt has hydrocortosone cream for reddened, itching area, instructed her not to use steroid cream for more than two weeks due to side effects, to stop as soon as itching resolved. Pt refused tubigrip (no latex) feels that it makes skin irritation worse. All questions answered, pt and daughter verbalized understanding.   Professional Collaboration: none      Assessment:      Wound etiology: trauma, infection    Wound Progress: resolved, skin still irritated (contact dermatitis?)  Rationale for Treatment: Brittanie for scaffolding & antimicrobial , Tubigrip for management of edema.    Patient tolerance/compliance: Pt agreeable with poc, asking pertinent questions, involved in visit.    Complicating factors: age, infection    Need for ongoing Advanced Wound Care services: continued skilled wound care for debridement as needed, dressing management and skilled clinical observation to prevent complications and expedite healing.    Plan:      Treatment Plan and Recommendations:  Diagnosis/ICD10:  L03.115 (cellulitis  SASHA); L97.912 (non pressure chronic ulcer right leg with fat layer exposed)     Procedures/CPT: Selective debridement 44302    Frequency: 1x/week      Treatment Goals: STG 2 Weeks  LTG 4 Weeks   Granulation Tissue: 100% 100%   Decrease Necrotic Tissue to: 0% 0%   Wound Phase:  proliferation proliferation   Decrease Size by: 30% 75%   Periwound:  Remains intact Remains intact   Decrease tracts/undermining by: n/a n/a   Decrease Pain:  n/a n/a       At the time of each visit a thorough assessment of the patient is completed to assure the  appropriateness of our plan of care.  The dressings or modalities may need to be adapted   from the original plan to address any significant changes in the wound environment.          Clinician Signature:_______________________________Date__________________      Physician Signature:______________________________Date:__________________

## 2018-02-01 ENCOUNTER — NON-PROVIDER VISIT (OUTPATIENT)
Dept: WOUND CARE | Facility: MEDICAL CENTER | Age: 83
End: 2018-02-01
Attending: NURSE PRACTITIONER
Payer: MEDICARE

## 2018-02-01 DIAGNOSIS — S81.811A NONINFECTED SKIN TEAR OF RIGHT LOWER EXTREMITY, INITIAL ENCOUNTER: Primary | ICD-10-CM

## 2018-02-01 PROCEDURE — 99211 OFF/OP EST MAY X REQ PHY/QHP: CPT | Mod: 25

## 2018-02-01 PROCEDURE — 11055 PARING/CUTG B9 HYPRKER LES 1: CPT

## 2018-02-01 PROCEDURE — 11721 DEBRIDE NAIL 6 OR MORE: CPT

## 2018-02-01 NOTE — CERTIFICATION
[facility]   [address]   [address]   [place] [id number]   [phone] Fax: [phone]      Foot and Nail Assessment   Initial eval 02/01/2018      Referring Physician: Edwina MISHRA  Primary Physician:  none  Consulting Physicians:   Start of Care: 02/01/2018       Subjective:        HPI: Pt is an 82 year old lady with no appreciable medical history or problem list, was attending [facility] for a skin tear which has since resolved. She is referred for foot and nail care by Edwina MISHRA due to mycotic, dystrophic nails that pt is not able to care for herself any more     History of foot ulceration(s): Yes____  No__x___        Location:_____________________________________________________    History of foot surgery: Yes____  No_x___    Describe:_____________________________________________________      Employed: Y ___ N _x__ Job description: ___retired_____________________________   Current Residence: _______at home with niece as CG__________________________      Pain: pt denies pain        Past Medical History: none per pt    Current Medications:  not taking any Rx meds currently per pt      Objective:    Tests and Measures:    HgbA1C:na      5.07 Monofilament testing (10 pt):  Right_____________________               Left_______________________        Vascular  - 12/04/17: DP/PT pulses multiphasic per doppler.      Left leg Right leg    164    176   Brachial 194 190   ARBEN 0.99 0.91        R L    1+ 1+ DP pulse   1+ 1+ PT pulse   <3 <3 Capillary refill < 3 sec       Deformities  R L      Hammer/claw toe     Hallux Valgus     Prominent Metatarsal Heads     Charcot Foot     Drop Foot     Rocker Bottom     Prior amputation:     Other:none        Clinical appearance/skin  Dryness__mild to mod___________ Swelling__1+ BLE____________ Redness__none___________Temperature___warm__________  Callus_____R medial hallux__________________________________  Ulceration_____none on feet.  Small open area on R anterior LE, skin tear wound that hasn't fully healed yet_______________________________     Clinical appearance/toenails  Onychomycosis___all 10, hallux worse__________________________  Ingrown toenails_____________________________  Deformities_________________________________  Other______________________________________      Orthotic, protective, supportive devices: none      Procedures:  Pt's feet were washed with soapy water then dried. Debris removed from between toes with gauze. Cuticle and nailbed margins were established and debris removed from around and beneath toenails with a curette. Dystrophic, onychomycotic nails (10) were trimmed with clippers, then smoothed and finished with dremel. Callus on R medial hallux was pared down to almost skin level with a scalpel. Tea tree oil applied to nailbeds and cuticles. Feet loli were moisturized except for between toes. No nicks or cuts noted. Wound care performed to R anterior LE - cleansed with NSS, skin prep and zinc paste to cynthia wound skin, moist AqAg to wound, covered with small ad foam. Tubi E to BLE ( pt not wearing her compression stockings today).          Patient Education: pt instr s/s infection, when to call MD/go to ER. Instr to moisturize feet and BLE daily with water based moisturizer, except between toes.Instr daily foot checks, report any blisters/open areas and medical attention promptly. Pt instr to make an appt again for 3 months for foot care, and next week for a wound eval. Pt with good understanding.      Professional Collaboration: Eval sent to referring provider via EPIC. 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 Wound eval next week          Clinician Signature:__Walter Asif RN, CFCN, CWS, FACCWS_____________________________Date___02/01/2018______________     Physician Signature:______________________________Date:__________________

## 2018-02-13 ENCOUNTER — APPOINTMENT (OUTPATIENT)
Dept: WOUND CARE | Facility: MEDICAL CENTER | Age: 83
End: 2018-02-13
Attending: NURSE PRACTITIONER
Payer: MEDICARE

## 2020-04-30 ENCOUNTER — HOSPITAL ENCOUNTER (INPATIENT)
Facility: MEDICAL CENTER | Age: 85
LOS: 5 days | DRG: 372 | End: 2020-05-06
Attending: EMERGENCY MEDICINE | Admitting: INTERNAL MEDICINE
Payer: MEDICARE

## 2020-04-30 DIAGNOSIS — E86.0 DEHYDRATION: ICD-10-CM

## 2020-04-30 DIAGNOSIS — A04.72 C. DIFFICILE COLITIS: ICD-10-CM

## 2020-04-30 DIAGNOSIS — R19.7 DIARRHEA, UNSPECIFIED TYPE: ICD-10-CM

## 2020-04-30 LAB
ALBUMIN SERPL BCP-MCNC: 3.6 G/DL (ref 3.2–4.9)
ALBUMIN/GLOB SERPL: 1.4 G/DL
ALP SERPL-CCNC: 112 U/L (ref 30–99)
ALT SERPL-CCNC: 7 U/L (ref 2–50)
ANION GAP SERPL CALC-SCNC: 11 MMOL/L (ref 7–16)
AST SERPL-CCNC: 12 U/L (ref 12–45)
BASOPHILS # BLD AUTO: 0.3 % (ref 0–1.8)
BASOPHILS # BLD: 0.03 K/UL (ref 0–0.12)
BILIRUB SERPL-MCNC: 0.6 MG/DL (ref 0.1–1.5)
BUN SERPL-MCNC: 24 MG/DL (ref 8–22)
C DIFF DNA SPEC QL NAA+PROBE: NORMAL
C DIFF TOX A+B STL QL IA: POSITIVE
C DIFF TOX GENS STL QL NAA+PROBE: NORMAL
CALCIUM SERPL-MCNC: 9.3 MG/DL (ref 8.5–10.5)
CHLORIDE SERPL-SCNC: 99 MMOL/L (ref 96–112)
CO2 SERPL-SCNC: 26 MMOL/L (ref 20–33)
CREAT SERPL-MCNC: 0.67 MG/DL (ref 0.5–1.4)
EOSINOPHIL # BLD AUTO: 0.07 K/UL (ref 0–0.51)
EOSINOPHIL NFR BLD: 0.6 % (ref 0–6.9)
ERYTHROCYTE [DISTWIDTH] IN BLOOD BY AUTOMATED COUNT: 50.6 FL (ref 35.9–50)
G LAMBLIA+C PARVUM AG STL QL RAPID: NORMAL
GLOBULIN SER CALC-MCNC: 2.5 G/DL (ref 1.9–3.5)
GLUCOSE SERPL-MCNC: 131 MG/DL (ref 65–99)
HCT VFR BLD AUTO: 40.1 % (ref 37–47)
HGB BLD-MCNC: 12.9 G/DL (ref 12–16)
IMM GRANULOCYTES # BLD AUTO: 0.04 K/UL (ref 0–0.11)
IMM GRANULOCYTES NFR BLD AUTO: 0.4 % (ref 0–0.9)
LYMPHOCYTES # BLD AUTO: 1.06 K/UL (ref 1–4.8)
LYMPHOCYTES NFR BLD: 9.7 % (ref 22–41)
MCH RBC QN AUTO: 28.9 PG (ref 27–33)
MCHC RBC AUTO-ENTMCNC: 32.2 G/DL (ref 33.6–35)
MCV RBC AUTO: 89.7 FL (ref 81.4–97.8)
MONOCYTES # BLD AUTO: 1.16 K/UL (ref 0–0.85)
MONOCYTES NFR BLD AUTO: 10.7 % (ref 0–13.4)
NEUTROPHILS # BLD AUTO: 8.53 K/UL (ref 2–7.15)
NEUTROPHILS NFR BLD: 78.3 % (ref 44–72)
NRBC # BLD AUTO: 0 K/UL
NRBC BLD-RTO: 0 /100 WBC
PLATELET # BLD AUTO: 194 K/UL (ref 164–446)
PMV BLD AUTO: 9.7 FL (ref 9–12.9)
POTASSIUM SERPL-SCNC: 4 MMOL/L (ref 3.6–5.5)
PROT SERPL-MCNC: 6.1 G/DL (ref 6–8.2)
RBC # BLD AUTO: 4.47 M/UL (ref 4.2–5.4)
SIGNIFICANT IND 70042: NORMAL
SITE SITE: NORMAL
SODIUM SERPL-SCNC: 136 MMOL/L (ref 135–145)
SOURCE SOURCE: NORMAL
WBC # BLD AUTO: 10.9 K/UL (ref 4.8–10.8)

## 2020-04-30 PROCEDURE — 85025 COMPLETE CBC W/AUTO DIFF WBC: CPT

## 2020-04-30 PROCEDURE — 87329 GIARDIA AG IA: CPT

## 2020-04-30 PROCEDURE — 87324 CLOSTRIDIUM AG IA: CPT

## 2020-04-30 PROCEDURE — 700105 HCHG RX REV CODE 258: Performed by: EMERGENCY MEDICINE

## 2020-04-30 PROCEDURE — 87328 CRYPTOSPORIDIUM AG IA: CPT

## 2020-04-30 PROCEDURE — A9270 NON-COVERED ITEM OR SERVICE: HCPCS | Performed by: HOSPITALIST

## 2020-04-30 PROCEDURE — 700102 HCHG RX REV CODE 250 W/ 637 OVERRIDE(OP): Performed by: HOSPITALIST

## 2020-04-30 PROCEDURE — 87899 AGENT NOS ASSAY W/OPTIC: CPT

## 2020-04-30 PROCEDURE — 87046 STOOL CULTR AEROBIC BACT EA: CPT

## 2020-04-30 PROCEDURE — G0378 HOSPITAL OBSERVATION PER HR: HCPCS

## 2020-04-30 PROCEDURE — 80053 COMPREHEN METABOLIC PANEL: CPT

## 2020-04-30 PROCEDURE — 87045 FECES CULTURE AEROBIC BACT: CPT

## 2020-04-30 PROCEDURE — 87493 C DIFF AMPLIFIED PROBE: CPT

## 2020-04-30 PROCEDURE — 700105 HCHG RX REV CODE 258: Performed by: HOSPITALIST

## 2020-04-30 PROCEDURE — 99285 EMERGENCY DEPT VISIT HI MDM: CPT

## 2020-04-30 PROCEDURE — 99219 PR INITIAL OBSERVATION CARE,LEVL II: CPT | Performed by: HOSPITALIST

## 2020-04-30 RX ORDER — SODIUM CHLORIDE, SODIUM LACTATE, POTASSIUM CHLORIDE, CALCIUM CHLORIDE 600; 310; 30; 20 MG/100ML; MG/100ML; MG/100ML; MG/100ML
INJECTION, SOLUTION INTRAVENOUS CONTINUOUS
Status: DISCONTINUED | OUTPATIENT
Start: 2020-04-30 | End: 2020-05-04

## 2020-04-30 RX ORDER — LISINOPRIL 5 MG/1
5 TABLET ORAL DAILY
Status: DISCONTINUED | OUTPATIENT
Start: 2020-05-01 | End: 2020-05-03

## 2020-04-30 RX ORDER — LISINOPRIL 5 MG/1
5 TABLET ORAL DAILY
Status: ON HOLD | COMMUNITY
End: 2020-05-06

## 2020-04-30 RX ORDER — ONDANSETRON 2 MG/ML
4 INJECTION INTRAMUSCULAR; INTRAVENOUS EVERY 4 HOURS PRN
Status: DISCONTINUED | OUTPATIENT
Start: 2020-04-30 | End: 2020-05-06 | Stop reason: HOSPADM

## 2020-04-30 RX ORDER — ACETAMINOPHEN 325 MG/1
650 TABLET ORAL EVERY 6 HOURS PRN
Status: DISCONTINUED | OUTPATIENT
Start: 2020-04-30 | End: 2020-05-06 | Stop reason: HOSPADM

## 2020-04-30 RX ORDER — LABETALOL HYDROCHLORIDE 5 MG/ML
10 INJECTION, SOLUTION INTRAVENOUS EVERY 4 HOURS PRN
Status: DISCONTINUED | OUTPATIENT
Start: 2020-04-30 | End: 2020-05-06 | Stop reason: HOSPADM

## 2020-04-30 RX ORDER — SODIUM CHLORIDE 9 MG/ML
1000 INJECTION, SOLUTION INTRAVENOUS ONCE
Status: COMPLETED | OUTPATIENT
Start: 2020-04-30 | End: 2020-04-30

## 2020-04-30 RX ORDER — ONDANSETRON 4 MG/1
4 TABLET, ORALLY DISINTEGRATING ORAL EVERY 4 HOURS PRN
Status: DISCONTINUED | OUTPATIENT
Start: 2020-04-30 | End: 2020-05-06 | Stop reason: HOSPADM

## 2020-04-30 RX ADMIN — VANCOMYCIN HYDROCHLORIDE 125 MG: KIT ORAL at 23:36

## 2020-04-30 RX ADMIN — SODIUM CHLORIDE, POTASSIUM CHLORIDE, SODIUM LACTATE AND CALCIUM CHLORIDE: 600; 310; 30; 20 INJECTION, SOLUTION INTRAVENOUS at 21:25

## 2020-04-30 RX ADMIN — SODIUM CHLORIDE 1000 ML: 9 INJECTION, SOLUTION INTRAVENOUS at 14:40

## 2020-04-30 ASSESSMENT — ENCOUNTER SYMPTOMS
BLOOD IN STOOL: 0
VOMITING: 0
NECK PAIN: 0
PALPITATIONS: 0
POLYDIPSIA: 0
TREMORS: 0
SINUS PAIN: 0
HEADACHES: 0
WHEEZING: 0
CLAUDICATION: 0
FEVER: 0
SHORTNESS OF BREATH: 0
ORTHOPNEA: 0
EYE PAIN: 0
FALLS: 0
SORE THROAT: 0
NAUSEA: 0
CHILLS: 0
DIARRHEA: 1
HALLUCINATIONS: 0
CONSTIPATION: 0
DIZZINESS: 0
DOUBLE VISION: 0
SPUTUM PRODUCTION: 0
WEAKNESS: 0
TINGLING: 0
BACK PAIN: 0
ABDOMINAL PAIN: 0
STRIDOR: 0
DIAPHORESIS: 0
BLURRED VISION: 0
PND: 0
HEARTBURN: 0
MYALGIAS: 0
BRUISES/BLEEDS EASILY: 0
PHOTOPHOBIA: 0
HEMOPTYSIS: 0
FLANK PAIN: 0
DEPRESSION: 0
COUGH: 0

## 2020-04-30 ASSESSMENT — LIFESTYLE VARIABLES
EVER_SMOKED: YES
SUBSTANCE_ABUSE: 0

## 2020-04-30 NOTE — DISCHARGE PLANNING
Yoshi called [facility] and spoke with Dona () and Laura ().  SW was informed that Pt just moved in on Monday from [place] and was positive for C-Diff when she moved into [place].  YOSHI was also informed that the have state regulations that state that they are not able to have someone with C-Diff in their facility.  They are willing to have the Pt return however stated that as soon as the Pt has another watery bowel movement they will have to send her right back.      YOSHI will check with bedside RN and call Dona back when transportation is arranged.

## 2020-04-30 NOTE — ED PROVIDER NOTES
"ED Provider Note    CHIEF COMPLAINT  Chief Complaint   Patient presents with   • Diarrhea     [facility] care facility for diarrhea x2 days. H/o C. Diff 3 weeks ago. Pt denies pain. States stool is watery.        HPI  Yokasta Thomason is a 85 y.o. female who presents with complaint of diarrhea for 2 days.  3 weeks ago was treated for C. difficile.  Stool is described as watery.  No blood.  She denies abdominal pain.  No nausea or vomiting.  No chest pain or shortness of breath.  No apparent distress, she states \"I am embarrassed\" as has had some problems with stool incontinence secondary to the diarrhea.  She denies dysuria.  No flank pain    REVIEW OF SYSTEMS  Constitutional: No fever or dizziness  Respiratory: No shortness of breath  Cardiac: No chest pain or syncope  Gastrointestinal: Diarrhea, history of C. difficile  Musculoskeletal: No acute neck or back pain  Neurologic: No headache  Genitourinary: Denies dysuria       All other systems are negative.     PAST MEDICAL HISTORY  Past Medical History:   Diagnosis Date   • C. difficile diarrhea    • Chronic obstructive pulmonary disease (HCC)    • Hypertension        FAMILY HISTORY  History reviewed. No pertinent family history.    SOCIAL HISTORY  Social History     Socioeconomic History   • Marital status: Single     Spouse name: Not on file   • Number of children: Not on file   • Years of education: Not on file   • Highest education level: Not on file   Occupational History   • Not on file   Social Needs   • Financial resource strain: Not on file   • Food insecurity     Worry: Not on file     Inability: Not on file   • Transportation needs     Medical: Not on file     Non-medical: Not on file   Tobacco Use   • Smoking status: Former Smoker   • Smokeless tobacco: Never Used   Substance and Sexual Activity   • Alcohol use: Not on file   • Drug use: Not on file   • Sexual activity: Not on file   Lifestyle   • Physical activity     Days per week: Not " "on file     Minutes per session: Not on file   • Stress: Not on file   Relationships   • Social connections     Talks on phone: Not on file     Gets together: Not on file     Attends Restorationist service: Not on file     Active member of club or organization: Not on file     Attends meetings of clubs or organizations: Not on file     Relationship status: Not on file   • Intimate partner violence     Fear of current or ex partner: Not on file     Emotionally abused: Not on file     Physically abused: Not on file     Forced sexual activity: Not on file   Other Topics Concern   • Not on file   Social History Narrative   • Not on file       SURGICAL HISTORY  History reviewed. No pertinent surgical history.    CURRENT MEDICATIONS  Home Medications    **Home medications have not yet been reviewed for this encounter**         ALLERGIES  No Known Allergies    PHYSICAL EXAM  VITAL SIGNS: /87   Pulse 85   Temp 36.6 °C (97.9 °F) (Temporal)   Resp 16   Ht 1.473 m (4' 10\")   Wt 48.5 kg (106 lb 14.8 oz)   SpO2 99%   BMI 22.35 kg/m²   Constitutional: Thin,no distress  ENT: Nares clear, mucous membranes slightly dry.  Eyes:  Conjunctiva normal, No discharge.    Lymphatic: No adenopathy.   Cardiovascular: Normal heart rate, Normal rhythm.   Pulmonary: No wheezing, no rales  Gastrointestinal: Abdomen is flat, soft, no tenderness, bowel sounds active.  Skin: Warm, Dry, No jaundice.   Musculoskeletal:  No CVA tenderness.   Neurologic:  Normal motor and sensory function, No focal deficits noted.   Psychiatric:Normal affect, Normal mood.    RADIOLOGY/PROCEDURES/Labs  Results for orders placed or performed during the hospital encounter of 04/30/20   CBC WITH DIFFERENTIAL   Result Value Ref Range    WBC 10.9 (H) 4.8 - 10.8 K/uL    RBC 4.47 4.20 - 5.40 M/uL    Hemoglobin 12.9 12.0 - 16.0 g/dL    Hematocrit 40.1 37.0 - 47.0 %    MCV 89.7 81.4 - 97.8 fL    MCH 28.9 27.0 - 33.0 pg    MCHC 32.2 (L) 33.6 - 35.0 g/dL    RDW 50.6 (H) " 35.9 - 50.0 fL    Platelet Count 194 164 - 446 K/uL    MPV 9.7 9.0 - 12.9 fL    Neutrophils-Polys 78.30 (H) 44.00 - 72.00 %    Lymphocytes 9.70 (L) 22.00 - 41.00 %    Monocytes 10.70 0.00 - 13.40 %    Eosinophils 0.60 0.00 - 6.90 %    Basophils 0.30 0.00 - 1.80 %    Immature Granulocytes 0.40 0.00 - 0.90 %    Nucleated RBC 0.00 /100 WBC    Neutrophils (Absolute) 8.53 (H) 2.00 - 7.15 K/uL    Lymphs (Absolute) 1.06 1.00 - 4.80 K/uL    Monos (Absolute) 1.16 (H) 0.00 - 0.85 K/uL    Eos (Absolute) 0.07 0.00 - 0.51 K/uL    Baso (Absolute) 0.03 0.00 - 0.12 K/uL    Immature Granulocytes (abs) 0.04 0.00 - 0.11 K/uL    NRBC (Absolute) 0.00 K/uL   COMP METABOLIC PANEL   Result Value Ref Range    Sodium 136 135 - 145 mmol/L    Potassium 4.0 3.6 - 5.5 mmol/L    Chloride 99 96 - 112 mmol/L    Co2 26 20 - 33 mmol/L    Anion Gap 11.0 7.0 - 16.0    Glucose 131 (H) 65 - 99 mg/dL    Bun 24 (H) 8 - 22 mg/dL    Creatinine 0.67 0.50 - 1.40 mg/dL    Calcium 9.3 8.5 - 10.5 mg/dL    AST(SGOT) 12 12 - 45 U/L    ALT(SGPT) 7 2 - 50 U/L    Alkaline Phosphatase 112 (H) 30 - 99 U/L    Total Bilirubin 0.6 0.1 - 1.5 mg/dL    Albumin 3.6 3.2 - 4.9 g/dL    Total Protein 6.1 6.0 - 8.2 g/dL    Globulin 2.5 1.9 - 3.5 g/dL    A-G Ratio 1.4 g/dL   ESTIMATED GFR   Result Value Ref Range    GFR If African American >60 >60 mL/min/1.73 m 2    GFR If Non African American >60 >60 mL/min/1.73 m 2         COURSE & MEDICAL DECISION MAKING  Pertinent Labs & Imaging studies reviewed. (See chart for details)  With some dryness to the mucous membranes and elevated BUN to creatinine ratio, patient is treated with IV fluids.HYDRATION: Based on the patient's presentation of Acute Diarrhea and Dehydration the patient was given IV fluids. IV Hydration was used because oral hydration was not adequate alone. Upon recheck following hydration, the patient was Improved.  We are attempting to obtain stool sample for retesting of C. difficile.  Her abdominal exam is benign, no  tenderness.  Patient stable for discharge back to her care home with follow-up with primary doctor for results of stool testing and treatment as needed.    FINAL IMPRESSION  1. Diarrhea, unspecified type     2. Dehydration             Electronically signed by: Jewel Nuñez M.D., 4/30/2020 1:57 PM

## 2020-04-30 NOTE — ED NOTES
Spoke with nurse Dona at [facility] and she is refusing to accept patient back because we have not been able to test patient for C. Diff.  [facility] Social work notified and involved.   Patient has not had bowel movement since being at this facility, and have been unable to obtain stool for testing. Denies pain. VSS.

## 2020-04-30 NOTE — ED TRIAGE NOTES
"Chief Complaint   Patient presents with   • Diarrhea     [facility] care facility for diarrhea x2 days. H/o C. Diff 3 weeks ago. Pt denies pain. States stool is watery.      Denies fevers, respiratory symptoms or contact with anyone who has tested positive for COVID.   Mask in place.     /87   Pulse 85   Temp 36.6 °C (97.9 °F) (Temporal)   Resp 16   Ht 1.473 m (4' 10\")   Wt 48.5 kg (106 lb 14.8 oz)   SpO2 99%   BMI 22.35 kg/m²     "

## 2020-04-30 NOTE — DISCHARGE PLANNING
SW updated by bedside RN that Pt had bowel movement and the sample was sent for testing.  SW spoke with ERP and decision was made to admit Pt.

## 2020-05-01 PROBLEM — I10 ESSENTIAL HYPERTENSION: Status: ACTIVE | Noted: 2020-05-01

## 2020-05-01 PROBLEM — J44.9 COPD (CHRONIC OBSTRUCTIVE PULMONARY DISEASE) (HCC): Status: ACTIVE | Noted: 2020-05-01

## 2020-05-01 PROBLEM — J96.11 CHRONIC RESPIRATORY FAILURE WITH HYPOXIA (HCC): Status: ACTIVE | Noted: 2020-05-01

## 2020-05-01 PROBLEM — A04.72 C. DIFFICILE COLITIS: Status: ACTIVE | Noted: 2020-05-01

## 2020-05-01 LAB
ALBUMIN SERPL BCP-MCNC: 2.7 G/DL (ref 3.2–4.9)
ALBUMIN/GLOB SERPL: 1.1 G/DL
ALP SERPL-CCNC: 93 U/L (ref 30–99)
ALT SERPL-CCNC: 8 U/L (ref 2–50)
ANION GAP SERPL CALC-SCNC: 8 MMOL/L (ref 7–16)
AST SERPL-CCNC: 12 U/L (ref 12–45)
BASOPHILS # BLD AUTO: 0.4 % (ref 0–1.8)
BASOPHILS # BLD: 0.03 K/UL (ref 0–0.12)
BILIRUB SERPL-MCNC: 0.5 MG/DL (ref 0.1–1.5)
BUN SERPL-MCNC: 18 MG/DL (ref 8–22)
CALCIUM SERPL-MCNC: 8.6 MG/DL (ref 8.5–10.5)
CHLORIDE SERPL-SCNC: 104 MMOL/L (ref 96–112)
CO2 SERPL-SCNC: 25 MMOL/L (ref 20–33)
CREAT SERPL-MCNC: 0.48 MG/DL (ref 0.5–1.4)
E COLI SXT1+2 STL IA: NORMAL
EOSINOPHIL # BLD AUTO: 0.16 K/UL (ref 0–0.51)
EOSINOPHIL NFR BLD: 2.1 % (ref 0–6.9)
ERYTHROCYTE [DISTWIDTH] IN BLOOD BY AUTOMATED COUNT: 51.5 FL (ref 35.9–50)
GLOBULIN SER CALC-MCNC: 2.4 G/DL (ref 1.9–3.5)
GLUCOSE SERPL-MCNC: 82 MG/DL (ref 65–99)
HCT VFR BLD AUTO: 37.1 % (ref 37–47)
HGB BLD-MCNC: 11.7 G/DL (ref 12–16)
IMM GRANULOCYTES # BLD AUTO: 0.04 K/UL (ref 0–0.11)
IMM GRANULOCYTES NFR BLD AUTO: 0.5 % (ref 0–0.9)
LYMPHOCYTES # BLD AUTO: 0.92 K/UL (ref 1–4.8)
LYMPHOCYTES NFR BLD: 12.2 % (ref 22–41)
MAGNESIUM SERPL-MCNC: 1.8 MG/DL (ref 1.5–2.5)
MCH RBC QN AUTO: 28.9 PG (ref 27–33)
MCHC RBC AUTO-ENTMCNC: 31.5 G/DL (ref 33.6–35)
MCV RBC AUTO: 91.6 FL (ref 81.4–97.8)
MONOCYTES # BLD AUTO: 0.82 K/UL (ref 0–0.85)
MONOCYTES NFR BLD AUTO: 10.8 % (ref 0–13.4)
NEUTROPHILS # BLD AUTO: 5.6 K/UL (ref 2–7.15)
NEUTROPHILS NFR BLD: 74 % (ref 44–72)
NRBC # BLD AUTO: 0 K/UL
NRBC BLD-RTO: 0 /100 WBC
PLATELET # BLD AUTO: 162 K/UL (ref 164–446)
PMV BLD AUTO: 9.5 FL (ref 9–12.9)
POTASSIUM SERPL-SCNC: 3.7 MMOL/L (ref 3.6–5.5)
PROT SERPL-MCNC: 5.1 G/DL (ref 6–8.2)
RBC # BLD AUTO: 4.05 M/UL (ref 4.2–5.4)
SIGNIFICANT IND 70042: NORMAL
SITE SITE: NORMAL
SODIUM SERPL-SCNC: 137 MMOL/L (ref 135–145)
SOURCE SOURCE: NORMAL
WBC # BLD AUTO: 7.6 K/UL (ref 4.8–10.8)

## 2020-05-01 PROCEDURE — A9270 NON-COVERED ITEM OR SERVICE: HCPCS | Performed by: HOSPITALIST

## 2020-05-01 PROCEDURE — 83735 ASSAY OF MAGNESIUM: CPT

## 2020-05-01 PROCEDURE — 85025 COMPLETE CBC W/AUTO DIFF WBC: CPT

## 2020-05-01 PROCEDURE — 80053 COMPREHEN METABOLIC PANEL: CPT

## 2020-05-01 PROCEDURE — 36415 COLL VENOUS BLD VENIPUNCTURE: CPT

## 2020-05-01 PROCEDURE — 770021 HCHG ROOM/CARE - ISO PRIVATE

## 2020-05-01 PROCEDURE — 97165 OT EVAL LOW COMPLEX 30 MIN: CPT

## 2020-05-01 PROCEDURE — 99232 SBSQ HOSP IP/OBS MODERATE 35: CPT | Performed by: INTERNAL MEDICINE

## 2020-05-01 PROCEDURE — 700105 HCHG RX REV CODE 258: Performed by: HOSPITALIST

## 2020-05-01 PROCEDURE — 97161 PT EVAL LOW COMPLEX 20 MIN: CPT

## 2020-05-01 PROCEDURE — 700102 HCHG RX REV CODE 250 W/ 637 OVERRIDE(OP): Performed by: HOSPITALIST

## 2020-05-01 RX ORDER — ALBUTEROL SULFATE 90 UG/1
2 AEROSOL, METERED RESPIRATORY (INHALATION) EVERY 4 HOURS PRN
Status: DISCONTINUED | OUTPATIENT
Start: 2020-05-01 | End: 2020-05-06 | Stop reason: HOSPADM

## 2020-05-01 RX ORDER — ALBUTEROL SULFATE 90 UG/1
2 AEROSOL, METERED RESPIRATORY (INHALATION)
Status: DISCONTINUED | OUTPATIENT
Start: 2020-05-01 | End: 2020-05-01

## 2020-05-01 RX ADMIN — SODIUM CHLORIDE, POTASSIUM CHLORIDE, SODIUM LACTATE AND CALCIUM CHLORIDE: 600; 310; 30; 20 INJECTION, SOLUTION INTRAVENOUS at 11:21

## 2020-05-01 RX ADMIN — VANCOMYCIN HYDROCHLORIDE 125 MG: KIT ORAL at 11:22

## 2020-05-01 RX ADMIN — VANCOMYCIN HYDROCHLORIDE 125 MG: KIT ORAL at 05:44

## 2020-05-01 RX ADMIN — VANCOMYCIN HYDROCHLORIDE 125 MG: KIT ORAL at 23:27

## 2020-05-01 RX ADMIN — LISINOPRIL 5 MG: 5 TABLET ORAL at 05:44

## 2020-05-01 RX ADMIN — SODIUM CHLORIDE, POTASSIUM CHLORIDE, SODIUM LACTATE AND CALCIUM CHLORIDE: 600; 310; 30; 20 INJECTION, SOLUTION INTRAVENOUS at 17:16

## 2020-05-01 RX ADMIN — VANCOMYCIN HYDROCHLORIDE 125 MG: KIT ORAL at 17:16

## 2020-05-01 ASSESSMENT — COGNITIVE AND FUNCTIONAL STATUS - GENERAL
HELP NEEDED FOR BATHING: A LOT
SUGGESTED CMS G CODE MODIFIER MOBILITY: CL
DAILY ACTIVITIY SCORE: 13
STANDING UP FROM CHAIR USING ARMS: A LITTLE
DRESSING REGULAR UPPER BODY CLOTHING: A LOT
SUGGESTED CMS G CODE MODIFIER DAILY ACTIVITY: CL
DRESSING REGULAR LOWER BODY CLOTHING: A LOT
HELP NEEDED FOR BATHING: A LOT
DRESSING REGULAR UPPER BODY CLOTHING: A LITTLE
MOVING FROM LYING ON BACK TO SITTING ON SIDE OF FLAT BED: A LOT
CLIMB 3 TO 5 STEPS WITH RAILING: TOTAL
MOBILITY SCORE: 11
SUGGESTED CMS G CODE MODIFIER DAILY ACTIVITY: CK
WALKING IN HOSPITAL ROOM: TOTAL
WALKING IN HOSPITAL ROOM: TOTAL
SUGGESTED CMS G CODE MODIFIER MOBILITY: CK
TURNING FROM BACK TO SIDE WHILE IN FLAT BAD: A LITTLE
TOILETING: A LOT
STANDING UP FROM CHAIR USING ARMS: A LOT
TOILETING: A LOT
DAILY ACTIVITIY SCORE: 16
DRESSING REGULAR LOWER BODY CLOTHING: A LOT
CLIMB 3 TO 5 STEPS WITH RAILING: TOTAL
EATING MEALS: A LITTLE
MOVING TO AND FROM BED TO CHAIR: A LOT
MOBILITY SCORE: 17
PERSONAL GROOMING: A LITTLE
PERSONAL GROOMING: A LOT

## 2020-05-01 ASSESSMENT — FIBROSIS 4 INDEX: FIB4 SCORE: 1.94

## 2020-05-01 ASSESSMENT — LIFESTYLE VARIABLES
HOW MANY TIMES IN THE PAST YEAR HAVE YOU HAD 5 OR MORE DRINKS IN A DAY: 0
TOTAL SCORE: 0
TOTAL SCORE: 0
EVER FELT BAD OR GUILTY ABOUT YOUR DRINKING: NO
TOTAL SCORE: 0
ON A TYPICAL DAY WHEN YOU DRINK ALCOHOL HOW MANY DRINKS DO YOU HAVE: 0
HAVE PEOPLE ANNOYED YOU BY CRITICIZING YOUR DRINKING: NO
ALCOHOL_USE: NO
DOES PATIENT WANT TO STOP DRINKING: NO
CONSUMPTION TOTAL: NEGATIVE
HAVE YOU EVER FELT YOU SHOULD CUT DOWN ON YOUR DRINKING: NO
EVER HAD A DRINK FIRST THING IN THE MORNING TO STEADY YOUR NERVES TO GET RID OF A HANGOVER: NO
AVERAGE NUMBER OF DAYS PER WEEK YOU HAVE A DRINK CONTAINING ALCOHOL: 0

## 2020-05-01 ASSESSMENT — PATIENT HEALTH QUESTIONNAIRE - PHQ9
1. LITTLE INTEREST OR PLEASURE IN DOING THINGS: NOT AT ALL
2. FEELING DOWN, DEPRESSED, IRRITABLE, OR HOPELESS: NOT AT ALL
SUM OF ALL RESPONSES TO PHQ9 QUESTIONS 1 AND 2: 0

## 2020-05-01 ASSESSMENT — ENCOUNTER SYMPTOMS
CHILLS: 0
CARDIOVASCULAR NEGATIVE: 1
MYALGIAS: 1
WEAKNESS: 1
ABDOMINAL PAIN: 1
FEVER: 0
DIARRHEA: 1
PSYCHIATRIC NEGATIVE: 1
CONSTITUTIONAL NEGATIVE: 1
EYES NEGATIVE: 1
RESPIRATORY NEGATIVE: 1
FALLS: 1

## 2020-05-01 ASSESSMENT — GAIT ASSESSMENTS: GAIT LEVEL OF ASSIST: UNABLE TO PARTICIPATE

## 2020-05-01 ASSESSMENT — ACTIVITIES OF DAILY LIVING (ADL): TOILETING: INDEPENDENT

## 2020-05-01 NOTE — PROGRESS NOTES
Two RN skin check (new admit):  Skin intact. Sacral area intact. Bilateral feet with dry, flaky skin and cracking heels (right > left heel). Left heel boggy. Floated on pillows. Fragile skin generalized. Steri strips to forehead intact. Meplex to left forearm (skin tear from where a door fell on her at home. No other skin issues noted at this time.

## 2020-05-01 NOTE — PROGRESS NOTES
Dr. Camryn Delacruz notified of patient's C.Diff positive result (stool). Physician stated he will start patient on oral vancomycin. Will continue to monitor patient closely for any changes in condition.

## 2020-05-01 NOTE — DISCHARGE PLANNING
As per Rounds discussion today, Pt still have to stay admitted until Pt's C Diff is resolved. Then Plan is to discharge Pt back to [facility].

## 2020-05-01 NOTE — THERAPY
"Occupational Therapy Evaluation completed.   Functional Status: spv supine to sit, spv sit to stand, spv stand pivot chair txfs with FWW, min A UB dressing, mod A LB dressing. Pt remained up in chair, chair alarm in place, call light and tray table within reach. RN notified.   Plan of Care: Will benefit from Occupational Therapy 4 times per week  Discharge Recommendations:  Equipment: Will Continue to Assess for Equipment Needs. Post-acute therapy Recommend post-acute placement for additional occupational therapy services prior to discharge home.    See \"Rehab Therapy-Acute\" Patient Summary Report for complete documentation.    85-yo female admitted for C-diff. She is a new resident of [facility], reports she is NWB on R LE from a GLF she sustained in March, and is undergoing rehab at [facility]. Per pt, she was I with ADLs and mobility PLOF, however, d/t recent fall, has recently been utilizing W/C at [facility]. Unable to confirm WB status, as no prior admission notes available. Pt currently presents with decreased activity tolerance, reduced balance, and weakness -- affecting ADLs and txfs. Pt will continue to benefit from acute OT 4x/wk. Anticipate post-acute placement for transitional care prior to dc back to [facility].  "

## 2020-05-01 NOTE — H&P
Hospital Medicine History & Physical Note    Date of Service  4/30/2020    Primary Care Physician  Pcp Pt States None    Code Status  Full Code    Chief Complaint  Chief Complaint   Patient presents with   • Diarrhea     BIB [facility] care facility for diarrhea x2 days. H/o C. Diff 3 weeks ago. Pt denies pain. States stool is watery.        History of Presenting Illness  85 y.o. female who presented 4/30/2020 with past medical history of C. difficile, COPD, hypertension comes into the emergency room with diarrhea for 2 days.  Patient states that she is having watery diarrhea as well as foul-smelling.  She states that she would go more than 3 times a day.  She denies any abdominal pain, fever, chills, nausea, vomiting.  Patient brought to the hospital found to be hypertensive and tachycardic.    Review of Systems  Review of Systems   Constitutional: Negative for chills, diaphoresis, fever and malaise/fatigue.   HENT: Negative for congestion, ear discharge, ear pain, hearing loss, nosebleeds, sinus pain, sore throat and tinnitus.    Eyes: Negative for blurred vision, double vision, photophobia and pain.   Respiratory: Negative for cough, hemoptysis, sputum production, shortness of breath, wheezing and stridor.    Cardiovascular: Negative for chest pain, palpitations, orthopnea, claudication, leg swelling and PND.   Gastrointestinal: Positive for diarrhea. Negative for abdominal pain, blood in stool, constipation, heartburn, melena, nausea and vomiting.   Genitourinary: Negative for dysuria, flank pain, frequency, hematuria and urgency.   Musculoskeletal: Negative for back pain, falls, joint pain, myalgias and neck pain.   Skin: Negative for itching and rash.   Neurological: Negative for dizziness, tingling, tremors, weakness and headaches.   Endo/Heme/Allergies: Negative for environmental allergies and polydipsia. Does not bruise/bleed easily.   Psychiatric/Behavioral: Negative for depression, hallucinations,  substance abuse and suicidal ideas.       Past Medical History   has a past medical history of C. difficile diarrhea, Chronic obstructive pulmonary disease (HCC), and Hypertension.    Surgical History  Surgical history reviewed and not pertinent    Family History  Family history reviewed not pertinent    Social History   reports that she has quit smoking. She has never used smokeless tobacco.    Allergies  No Known Allergies    Medications  Prior to Admission Medications   Prescriptions Last Dose Informant Patient Reported? Taking?   Probiotic Product (PROBIOTIC PO) 4/30/2020 at 0800 Patient Yes Yes   Sig: Take 1 Tab by mouth 2 Times a Day.   lisinopril (PRINIVIL) 5 MG Tab 4/30/2020 at 0800 Patient Yes Yes   Sig: Take 5 mg by mouth every day.      Facility-Administered Medications: None       Physical Exam  Temp:  [36.6 °C (97.9 °F)-36.8 °C (98.2 °F)] 36.8 °C (98.2 °F)  Pulse:  [73-91] 91  Resp:  [16-17] 17  BP: (129-170)/(75-87) 145/75  SpO2:  [96 %-99 %] 98 %    Physical Exam  Vitals signs and nursing note reviewed.   Constitutional:       General: She is not in acute distress.     Appearance: Normal appearance. She is not ill-appearing, toxic-appearing or diaphoretic.   HENT:      Head: Normocephalic and atraumatic.      Nose: No congestion or rhinorrhea.      Mouth/Throat:      Pharynx: No oropharyngeal exudate or posterior oropharyngeal erythema.   Eyes:      General: No scleral icterus.  Neck:      Musculoskeletal: No neck rigidity or muscular tenderness.      Vascular: No carotid bruit.   Cardiovascular:      Rate and Rhythm: Normal rate and regular rhythm.      Pulses: Normal pulses.      Heart sounds: Normal heart sounds. No murmur. No friction rub. No gallop.    Pulmonary:      Effort: Pulmonary effort is normal. No respiratory distress.      Breath sounds: Normal breath sounds. No stridor. No wheezing or rhonchi.   Abdominal:      General: Abdomen is flat. There is no distension.      Palpations: There is  no mass.      Tenderness: There is no abdominal tenderness. There is no left CVA tenderness, guarding or rebound.      Hernia: No hernia is present.   Musculoskeletal: Normal range of motion.         General: No swelling.      Right lower leg: No edema.      Left lower leg: No edema.   Lymphadenopathy:      Cervical: No cervical adenopathy.   Skin:     General: Skin is warm and dry.      Capillary Refill: Capillary refill takes more than 3 seconds.      Coloration: Skin is not jaundiced or pale.      Findings: No bruising or erythema.   Neurological:      Mental Status: She is alert.         Laboratory:  Recent Labs     04/30/20  1308   WBC 10.9*   RBC 4.47   HEMOGLOBIN 12.9   HEMATOCRIT 40.1   MCV 89.7   MCH 28.9   MCHC 32.2*   RDW 50.6*   PLATELETCT 194   MPV 9.7     Recent Labs     04/30/20  1308   SODIUM 136   POTASSIUM 4.0   CHLORIDE 99   CO2 26   GLUCOSE 131*   BUN 24*   CREATININE 0.67   CALCIUM 9.3     Recent Labs     04/30/20  1308   ALTSGPT 7   ASTSGOT 12   ALKPHOSPHAT 112*   TBILIRUBIN 0.6   GLUCOSE 131*         No results for input(s): NTPROBNP in the last 72 hours.      No results for input(s): TROPONINT in the last 72 hours.    Imaging:  No orders to display         Assessment/Plan:    * C. difficile colitis  Assessment & Plan  Second episode of C. Difficile  Not a fulminant episode, WBC is 10, creatinine is normal, albumin 3.6  Start oral vancomycin 2-week course with taper  Serial abdominal exams  Patient unable to be returned to nursing home with C. difficile    COPD (chronic obstructive pulmonary disease) ([facility])  Assessment & Plan  Not in exacerbation  Albuterol PRN    Chronic respiratory failure with hypoxia ([facility])  Assessment & Plan  On 2 L of O2 which is her baseline    Essential hypertension  Assessment & Plan  Uncontrolled  Continue current home medications  IV as needed medications have been ordered

## 2020-05-01 NOTE — CARE PLAN
Problem: Safety  Goal: Will remain free from falls  Outcome: PROGRESSING AS EXPECTED   PT has remained free from falls, bed alarm in place, pt room near nurses station, hourly rounding in place. Pt calls appropriately for staff assistance   Problem: Bowel/Gastric:  Goal: Normal bowel function is maintained or improved  Outcome: PROGRESSING SLOWER THAN EXPECTED   Pt having multiple loose bowel movements r/t C-diff infection.

## 2020-05-01 NOTE — THERAPY
"Pt is an 84 y/o female admitted from assisted living residence due to CDiff following SNF stay. Pt reports she had a GLF on March 13, 2020 and is to be NWB on R LE (no prior admission notes or formal WBing restrictions noted on current admission). Was at [facility] following GLF for rehab and was only performing transfers to  due to WBing restrictions. Pt stated from [facility] she transitioned to living in [facility] as she put her home for sale, she was functionally independent with FWW prior to GLF in March. Pt presents to physical therapy with mild impairements in transfers, gait and activity tolerance due to self reported WBing restrictions. Pt able to perform stand pivot transfer with FWW and SPV demonstrating WBAT through R LE due to chronic L leg length discrepency. Pt did not report any pain during therapy session, no surgical incision observed so assume pt was non-operative managment. Pt will benefit from acute PT interventions to address present impairments and provide DC recommendations, will need to confirm pt's R LE WB'ing status to accuately guide therapy interventions and POC- RN aware    Physical Therapy Evaluation completed.   Bed Mobility:  Supine to Sit: Supervised  Transfers: Sit to Stand: Supervised  Gait: Level Of Assist: Unable to Participate       Plan of Care: Will benefit from Physical Therapy 3 times per week  Discharge Recommendations: Equipment: Will Continue to Assess for Equipment Needs. Post-acute therapy: TBD, see above    See \"Rehab Therapy-Acute\" Patient Summary Report for complete documentation.       "

## 2020-05-01 NOTE — ASSESSMENT & PLAN NOTE
-Second episode of C. Difficile  -Not a fulminant episode  -Start oral vancomycin 2-week course with taper - start day 4/30/2020  -improving  -Patient unable to be returned to nursing home ([facility]) with C. difficile

## 2020-05-01 NOTE — PROGRESS NOTES
Bedside report received.  Assessment complete.  A&O x 4. Patient calls appropriately.  Per pt, she transfers to wheel chair at home. Turns with x1 assist.  Patient has 0/10 pain.   Denies N&V. Tolerating regular diet.  + void, + flatus, + multiple loose BM.  Patient denies SOB. Pt on 2L NC   Patient had bloody nose this AM, humidifier added to oxygen.    Review plan with of care with patient. Call light and personal belongings with in reach. Hourly rounding in place. All needs met at this time.

## 2020-05-02 LAB
ALBUMIN SERPL BCP-MCNC: 2.9 G/DL (ref 3.2–4.9)
ALBUMIN/GLOB SERPL: 1.3 G/DL
ALP SERPL-CCNC: 94 U/L (ref 30–99)
ALT SERPL-CCNC: 8 U/L (ref 2–50)
ANION GAP SERPL CALC-SCNC: 9 MMOL/L (ref 7–16)
AST SERPL-CCNC: 10 U/L (ref 12–45)
BILIRUB SERPL-MCNC: 0.4 MG/DL (ref 0.1–1.5)
BUN SERPL-MCNC: 14 MG/DL (ref 8–22)
CALCIUM SERPL-MCNC: 8.9 MG/DL (ref 8.5–10.5)
CHLORIDE SERPL-SCNC: 101 MMOL/L (ref 96–112)
CO2 SERPL-SCNC: 25 MMOL/L (ref 20–33)
CREAT SERPL-MCNC: 0.5 MG/DL (ref 0.5–1.4)
ERYTHROCYTE [DISTWIDTH] IN BLOOD BY AUTOMATED COUNT: 49.1 FL (ref 35.9–50)
GLOBULIN SER CALC-MCNC: 2.2 G/DL (ref 1.9–3.5)
GLUCOSE SERPL-MCNC: 100 MG/DL (ref 65–99)
HCT VFR BLD AUTO: 36.8 % (ref 37–47)
HGB BLD-MCNC: 12 G/DL (ref 12–16)
MCH RBC QN AUTO: 29.1 PG (ref 27–33)
MCHC RBC AUTO-ENTMCNC: 32.6 G/DL (ref 33.6–35)
MCV RBC AUTO: 89.1 FL (ref 81.4–97.8)
PLATELET # BLD AUTO: 158 K/UL (ref 164–446)
PMV BLD AUTO: 9.3 FL (ref 9–12.9)
POTASSIUM SERPL-SCNC: 3.3 MMOL/L (ref 3.6–5.5)
PROT SERPL-MCNC: 5.1 G/DL (ref 6–8.2)
RBC # BLD AUTO: 4.13 M/UL (ref 4.2–5.4)
SODIUM SERPL-SCNC: 135 MMOL/L (ref 135–145)
WBC # BLD AUTO: 7.5 K/UL (ref 4.8–10.8)

## 2020-05-02 PROCEDURE — A9270 NON-COVERED ITEM OR SERVICE: HCPCS | Performed by: HOSPITALIST

## 2020-05-02 PROCEDURE — 80053 COMPREHEN METABOLIC PANEL: CPT

## 2020-05-02 PROCEDURE — 700102 HCHG RX REV CODE 250 W/ 637 OVERRIDE(OP): Performed by: HOSPITALIST

## 2020-05-02 PROCEDURE — 770021 HCHG ROOM/CARE - ISO PRIVATE

## 2020-05-02 PROCEDURE — 85027 COMPLETE CBC AUTOMATED: CPT

## 2020-05-02 PROCEDURE — 99232 SBSQ HOSP IP/OBS MODERATE 35: CPT | Performed by: INTERNAL MEDICINE

## 2020-05-02 PROCEDURE — A9270 NON-COVERED ITEM OR SERVICE: HCPCS | Performed by: INTERNAL MEDICINE

## 2020-05-02 PROCEDURE — 700105 HCHG RX REV CODE 258: Performed by: HOSPITALIST

## 2020-05-02 PROCEDURE — 36415 COLL VENOUS BLD VENIPUNCTURE: CPT

## 2020-05-02 PROCEDURE — 700102 HCHG RX REV CODE 250 W/ 637 OVERRIDE(OP): Performed by: INTERNAL MEDICINE

## 2020-05-02 RX ORDER — POTASSIUM CHLORIDE 20 MEQ/1
40 TABLET, EXTENDED RELEASE ORAL 2 TIMES DAILY
Status: COMPLETED | OUTPATIENT
Start: 2020-05-02 | End: 2020-05-02

## 2020-05-02 RX ADMIN — SODIUM CHLORIDE, POTASSIUM CHLORIDE, SODIUM LACTATE AND CALCIUM CHLORIDE: 600; 310; 30; 20 INJECTION, SOLUTION INTRAVENOUS at 23:32

## 2020-05-02 RX ADMIN — POTASSIUM CHLORIDE 40 MEQ: 1500 TABLET, EXTENDED RELEASE ORAL at 18:20

## 2020-05-02 RX ADMIN — LISINOPRIL 5 MG: 5 TABLET ORAL at 05:40

## 2020-05-02 RX ADMIN — Medication 400 MG: at 12:07

## 2020-05-02 RX ADMIN — VANCOMYCIN HYDROCHLORIDE 125 MG: KIT ORAL at 12:06

## 2020-05-02 RX ADMIN — VANCOMYCIN HYDROCHLORIDE 125 MG: KIT ORAL at 05:40

## 2020-05-02 RX ADMIN — SODIUM CHLORIDE, POTASSIUM CHLORIDE, SODIUM LACTATE AND CALCIUM CHLORIDE: 600; 310; 30; 20 INJECTION, SOLUTION INTRAVENOUS at 11:14

## 2020-05-02 RX ADMIN — VANCOMYCIN HYDROCHLORIDE 125 MG: KIT ORAL at 18:20

## 2020-05-02 RX ADMIN — VANCOMYCIN HYDROCHLORIDE 125 MG: KIT ORAL at 23:33

## 2020-05-02 RX ADMIN — POTASSIUM CHLORIDE 40 MEQ: 1500 TABLET, EXTENDED RELEASE ORAL at 12:07

## 2020-05-02 ASSESSMENT — ENCOUNTER SYMPTOMS
FALLS: 1
FEVER: 0
CARDIOVASCULAR NEGATIVE: 1
EYES NEGATIVE: 1
WEAKNESS: 1
CHILLS: 0
DIARRHEA: 1
CONSTITUTIONAL NEGATIVE: 1
PSYCHIATRIC NEGATIVE: 1
RESPIRATORY NEGATIVE: 1
ABDOMINAL PAIN: 1
MYALGIAS: 1

## 2020-05-02 ASSESSMENT — FIBROSIS 4 INDEX: FIB4 SCORE: 1.86

## 2020-05-02 NOTE — CARE PLAN
Problem: Communication  Goal: The ability to communicate needs accurately and effectively will improve  Outcome: PROGRESSING AS EXPECTED     Problem: Safety  Goal: Will remain free from injury  Outcome: PROGRESSING AS EXPECTED  Note: Patient calls for assistance to the restroom or to go back to bed. Bed in low position and locked.   Goal: Will remain free from falls  Outcome: PROGRESSING AS EXPECTED

## 2020-05-02 NOTE — RESPIRATORY CARE
COPD EDUCATION by COPD CLINICAL EDUCATOR  5/2/2020 at 10:46 AM by Denita Michaels, RRT     Patient reviewed by COPD education team. Met with patient from doorway as she is in isolation and the hospital is conserving PPE, the patient denies COPD, she denies using home pulm meds, is a non-smoker and does not have an official dx of COPD, therefore she does not qualify for the COPD program.   Tolerating well.

## 2020-05-02 NOTE — PROGRESS NOTES
Hospital Medicine Daily Progress Note    Date of Service  5/2/2020    Chief Complaint  85 y.o. female admitted 4/30/2020 with diarrhea    Hospital Course   Ms. Thomason is a 85 y.o. female who presented 4/30/2020 with past medical history of C. difficile, COPD, hypertension comes into the emergency room with diarrhea for 2 days.  Patient states that she is having watery diarrhea as well as foul-smelling.  She states that she would go more than 3 times a day.  She denies any abdominal pain, fever, chills, nausea, vomiting. Patient brought to the hospital found to be hypertensive and tachycardic. Patient admitted for further evaluation and treatment.            Interval Problem Update  -Patient seen and examined. Patient still reports diarrhea episodes. Patient complains of RIGHT leg tenderness d/t a recent GLF in Mid March. Patient receiving PT/OT. Patient encouraged to utilized recommendations for PT/OT for mobilization. Patient aware in plan of care.  -Plan: anticipated 2 weeks oral antibiotics - vancomycin; unable to go back to [facility] until C. Diff is clear; will test for C. Diff once patient is not having diarrhea OR after 2 wks of abx therapy.  -Lab work: unremarkable - expected low electrolytes - replaced.  -VSS at this time  -Ordered CBC and CMP in am  -No significant changes from previous ROS/PE, please previous note for further details.    Consultants/Specialty  None    Code Status  FULL    Disposition  TBD    Review of Systems  Review of Systems   Constitutional: Negative.  Negative for chills and fever.   HENT: Negative.    Eyes: Negative.    Respiratory: Negative.    Cardiovascular: Negative.    Gastrointestinal: Positive for abdominal pain and diarrhea.   Genitourinary: Negative.    Musculoskeletal: Positive for falls, joint pain and myalgias.   Skin: Negative.    Neurological: Positive for weakness.   Psychiatric/Behavioral: Negative.         Physical Exam  Temp:  [36.4 °C (97.6  °F)-36.8 °C (98.3 °F)] 36.4 °C (97.6 °F)  Pulse:  [64-90] 72  Resp:  [17-18] 18  BP: (126-152)/(71-90) 147/76  SpO2:  [92 %-100 %] 100 %    Physical Exam  HENT:      Head: Normocephalic.      Nose: Nose normal.      Mouth/Throat:      Mouth: Mucous membranes are moist.   Eyes:      Pupils: Pupils are equal, round, and reactive to light.   Neck:      Musculoskeletal: Normal range of motion.   Cardiovascular:      Rate and Rhythm: Normal rate and regular rhythm.      Pulses: Normal pulses.      Heart sounds: Normal heart sounds.   Pulmonary:      Effort: Pulmonary effort is normal.      Breath sounds: Normal breath sounds.   Abdominal:      General: Bowel sounds are normal.      Palpations: Abdomen is soft.      Tenderness: There is abdominal tenderness.   Musculoskeletal:         General: Tenderness, deformity and signs of injury present.      Comments: Hx of GLF; right leg injury mid March   Skin:     General: Skin is warm.      Capillary Refill: Capillary refill takes 2 to 3 seconds.   Neurological:      Mental Status: She is alert. Mental status is at baseline.         Fluids    Intake/Output Summary (Last 24 hours) at 5/2/2020 1110  Last data filed at 5/2/2020 0400  Gross per 24 hour   Intake 2160 ml   Output --   Net 2160 ml       Laboratory  Recent Labs     04/30/20  1308 05/01/20  0353 05/02/20  0037   WBC 10.9* 7.6 7.5   RBC 4.47 4.05* 4.13*   HEMOGLOBIN 12.9 11.7* 12.0   HEMATOCRIT 40.1 37.1 36.8*   MCV 89.7 91.6 89.1   MCH 28.9 28.9 29.1   MCHC 32.2* 31.5* 32.6*   RDW 50.6* 51.5* 49.1   PLATELETCT 194 162* 158*   MPV 9.7 9.5 9.3     Recent Labs     04/30/20  1308 05/01/20  0353 05/02/20  0037   SODIUM 136 137 135   POTASSIUM 4.0 3.7 3.3*   CHLORIDE 99 104 101   CO2 26 25 25   GLUCOSE 131* 82 100*   BUN 24* 18 14   CREATININE 0.67 0.48* 0.50   CALCIUM 9.3 8.6 8.9                   Imaging  No orders to display        Assessment/Plan  * C. difficile colitis  Assessment & Plan  -Second episode of C.  Difficile  -Not a fulminant episode, WBC is 10, creatinine is normal, albumin 3.6  -Start oral vancomycin 2-week course with taper  -Serial abdominal exams  -Patient unable to be returned to nursing home ([facility]) with C. difficile    COPD (chronic obstructive pulmonary disease) ([facility])  Assessment & Plan  -Not in exacerbation  -Albuterol PRN    Chronic respiratory failure with hypoxia ([facility])  Assessment & Plan  -On 2 L of O2 which is her baseline    Essential hypertension  Assessment & Plan  -Uncontrolled  -Continue current home medications  -IV as needed medications have been ordered  -monitor       VTE prophylaxis: SCDs  ---------------------------------------------------------------------------------------------------------------------------------------------------------------------------------------------------------------------------------------------------------------------------------------  Please note that this dictation was created using voice recognition software. I have made every reasonable attempt to correct obvious errors, but there may be errors of grammar and possibly content that I did not discover before finalizing the note.    Electronically signed by:  AR Burris, MSN, APRN, FNP-C  Hospitalist Services  [facility]  [phone]  [email or web address]  05/02/2020   1116

## 2020-05-03 PROBLEM — T14.8XXA FRACTURE: Status: ACTIVE | Noted: 2020-05-03

## 2020-05-03 LAB
ALBUMIN SERPL BCP-MCNC: 2.7 G/DL (ref 3.2–4.9)
ALBUMIN/GLOB SERPL: 1.3 G/DL
ALP SERPL-CCNC: 81 U/L (ref 30–99)
ALT SERPL-CCNC: 10 U/L (ref 2–50)
ANION GAP SERPL CALC-SCNC: 7 MMOL/L (ref 7–16)
AST SERPL-CCNC: 15 U/L (ref 12–45)
BACTERIA STL CULT: NORMAL
BILIRUB SERPL-MCNC: 0.2 MG/DL (ref 0.1–1.5)
BUN SERPL-MCNC: 10 MG/DL (ref 8–22)
CALCIUM SERPL-MCNC: 8.6 MG/DL (ref 8.5–10.5)
CHLORIDE SERPL-SCNC: 103 MMOL/L (ref 96–112)
CO2 SERPL-SCNC: 28 MMOL/L (ref 20–33)
CREAT SERPL-MCNC: 0.34 MG/DL (ref 0.5–1.4)
E COLI SXT1+2 STL IA: NORMAL
ERYTHROCYTE [DISTWIDTH] IN BLOOD BY AUTOMATED COUNT: 49.3 FL (ref 35.9–50)
GLOBULIN SER CALC-MCNC: 2.1 G/DL (ref 1.9–3.5)
GLUCOSE SERPL-MCNC: 98 MG/DL (ref 65–99)
HCT VFR BLD AUTO: 35.1 % (ref 37–47)
HGB BLD-MCNC: 10.9 G/DL (ref 12–16)
MCH RBC QN AUTO: 28.4 PG (ref 27–33)
MCHC RBC AUTO-ENTMCNC: 31.1 G/DL (ref 33.6–35)
MCV RBC AUTO: 91.4 FL (ref 81.4–97.8)
PLATELET # BLD AUTO: 160 K/UL (ref 164–446)
PMV BLD AUTO: 9.6 FL (ref 9–12.9)
POTASSIUM SERPL-SCNC: 4.7 MMOL/L (ref 3.6–5.5)
PROT SERPL-MCNC: 4.8 G/DL (ref 6–8.2)
RBC # BLD AUTO: 3.84 M/UL (ref 4.2–5.4)
SIGNIFICANT IND 70042: NORMAL
SITE SITE: NORMAL
SODIUM SERPL-SCNC: 138 MMOL/L (ref 135–145)
SOURCE SOURCE: NORMAL
WBC # BLD AUTO: 5.1 K/UL (ref 4.8–10.8)

## 2020-05-03 PROCEDURE — A9270 NON-COVERED ITEM OR SERVICE: HCPCS | Performed by: HOSPITALIST

## 2020-05-03 PROCEDURE — 80053 COMPREHEN METABOLIC PANEL: CPT

## 2020-05-03 PROCEDURE — 85027 COMPLETE CBC AUTOMATED: CPT

## 2020-05-03 PROCEDURE — 700105 HCHG RX REV CODE 258: Performed by: HOSPITALIST

## 2020-05-03 PROCEDURE — 99232 SBSQ HOSP IP/OBS MODERATE 35: CPT | Performed by: INTERNAL MEDICINE

## 2020-05-03 PROCEDURE — 770021 HCHG ROOM/CARE - ISO PRIVATE

## 2020-05-03 PROCEDURE — 700102 HCHG RX REV CODE 250 W/ 637 OVERRIDE(OP): Performed by: HOSPITALIST

## 2020-05-03 PROCEDURE — 36415 COLL VENOUS BLD VENIPUNCTURE: CPT

## 2020-05-03 RX ORDER — LISINOPRIL 10 MG/1
10 TABLET ORAL DAILY
Status: DISCONTINUED | OUTPATIENT
Start: 2020-05-04 | End: 2020-05-06

## 2020-05-03 RX ADMIN — LISINOPRIL 5 MG: 5 TABLET ORAL at 05:44

## 2020-05-03 RX ADMIN — VANCOMYCIN HYDROCHLORIDE 125 MG: KIT ORAL at 17:12

## 2020-05-03 RX ADMIN — VANCOMYCIN HYDROCHLORIDE 125 MG: KIT ORAL at 23:53

## 2020-05-03 RX ADMIN — SODIUM CHLORIDE, POTASSIUM CHLORIDE, SODIUM LACTATE AND CALCIUM CHLORIDE: 600; 310; 30; 20 INJECTION, SOLUTION INTRAVENOUS at 23:53

## 2020-05-03 RX ADMIN — VANCOMYCIN HYDROCHLORIDE 125 MG: KIT ORAL at 11:55

## 2020-05-03 RX ADMIN — SODIUM CHLORIDE, POTASSIUM CHLORIDE, SODIUM LACTATE AND CALCIUM CHLORIDE: 600; 310; 30; 20 INJECTION, SOLUTION INTRAVENOUS at 11:57

## 2020-05-03 RX ADMIN — VANCOMYCIN HYDROCHLORIDE 125 MG: KIT ORAL at 05:44

## 2020-05-03 ASSESSMENT — ENCOUNTER SYMPTOMS
CONSTITUTIONAL NEGATIVE: 1
CARDIOVASCULAR NEGATIVE: 1
FALLS: 1
RESPIRATORY NEGATIVE: 1
DIARRHEA: 1
MYALGIAS: 1
CHILLS: 0
PSYCHIATRIC NEGATIVE: 1
WEAKNESS: 1
EYES NEGATIVE: 1
ABDOMINAL PAIN: 1
FEVER: 0

## 2020-05-03 NOTE — CARE PLAN
Problem: Infection  Goal: Will remain free from infection  Outcome: PROGRESSING AS EXPECTED  Note: Standard precaution in place. Educated  and encouraged regarding proper hand washing techniques.      Problem: Safety  Goal: Will remain free from falls  Outcome: PROGRESSING AS EXPECTED  Note: Hourly rounding.  Non-skid socks. Bed locked & in low position. Personal belongings and call light  within reach. .
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
## 2020-05-03 NOTE — PROGRESS NOTES
Hospital Medicine Daily Progress Note    Date of Service  5/3/2020    Chief Complaint  85 y.o. female admitted 4/30/2020 with diarrhea    Hospital Course   Ms. Thomason is a 85 y.o. female who presented 4/30/2020 with past medical history of C. difficile, COPD, hypertension comes into the emergency room with diarrhea for 2 days.  Patient states that she is having watery diarrhea as well as foul-smelling.  She states that she would go more than 3 times a day.  She denies any abdominal pain, fever, chills, nausea, vomiting. Patient brought to the hospital found to be hypertensive and tachycardic. Patient admitted for further evaluation and treatment.            Interval Problem Update  -Patient seen and examined. Patient laying in bed comfortably. Patient still reports diarrhea episodes. Patient complains of RIGHT leg tenderness d/t a recent GLF in Mid March. Patient receiving PT/OT. Patient encouraged to utilized recommendations for PT/OT for mobilization. Patient aware in plan of care. Daughter mentioned concerns for possible depression. Patient at this time does not show any signs of depression or reports any feelings of depression. Will continue to monitor.   -Plan: anticipated 2 weeks oral antibiotics - vancomycin; unable to go back to [facility] until C. Diff is clear; will test for C. Diff once patient is not having diarrhea OR after 2 wks of abx therapy.  -Lab work: unremarkable - expected low electrolytes - replaced.  -VSS at this time  -Ordered CBC and CMP in am  -No significant changes from previous ROS/PE, please previous note for further details.    Consultants/Specialty  None    Code Status  FULL    Disposition  TBD    Review of Systems  Review of Systems   Constitutional: Negative.  Negative for chills and fever.   HENT: Negative.    Eyes: Negative.    Respiratory: Negative.    Cardiovascular: Negative.    Gastrointestinal: Positive for abdominal pain and diarrhea.   Genitourinary:  Negative.    Musculoskeletal: Positive for falls, joint pain and myalgias.   Skin: Negative.    Neurological: Positive for weakness.   Psychiatric/Behavioral: Negative.         Physical Exam  Temp:  [35.9 °C (96.7 °F)-37.1 °C (98.8 °F)] 36.9 °C (98.4 °F)  Pulse:  [62-94] 80  Resp:  [16-20] 16  BP: (142-154)/(60-86) 147/73  SpO2:  [93 %-100 %] 98 %    Physical Exam  HENT:      Head: Normocephalic.      Nose: Nose normal.      Mouth/Throat:      Mouth: Mucous membranes are moist.   Eyes:      Pupils: Pupils are equal, round, and reactive to light.   Neck:      Musculoskeletal: Normal range of motion.   Cardiovascular:      Rate and Rhythm: Normal rate and regular rhythm.      Pulses: Normal pulses.      Heart sounds: Normal heart sounds.   Pulmonary:      Effort: Pulmonary effort is normal.      Breath sounds: Normal breath sounds.   Abdominal:      General: Bowel sounds are normal.      Palpations: Abdomen is soft.      Tenderness: There is abdominal tenderness.   Musculoskeletal:         General: Tenderness, deformity and signs of injury present.      Comments: Hx of GLF; right leg injury mid March   Skin:     General: Skin is warm.      Capillary Refill: Capillary refill takes 2 to 3 seconds.   Neurological:      Mental Status: She is alert. Mental status is at baseline.         Fluids    Intake/Output Summary (Last 24 hours) at 5/3/2020 1100  Last data filed at 5/3/2020 0450  Gross per 24 hour   Intake 1200 ml   Output 0 ml   Net 1200 ml       Laboratory  Recent Labs     05/01/20  0353 05/02/20  0037 05/03/20  0057   WBC 7.6 7.5 5.1   RBC 4.05* 4.13* 3.84*   HEMOGLOBIN 11.7* 12.0 10.9*   HEMATOCRIT 37.1 36.8* 35.1*   MCV 91.6 89.1 91.4   MCH 28.9 29.1 28.4   MCHC 31.5* 32.6* 31.1*   RDW 51.5* 49.1 49.3   PLATELETCT 162* 158* 160*   MPV 9.5 9.3 9.6     Recent Labs     05/01/20  0353 05/02/20  0037 05/03/20  0057   SODIUM 137 135 138   POTASSIUM 3.7 3.3* 4.7   CHLORIDE 104 101 103   CO2 25 25 28   GLUCOSE 82 100* 98    BUN 18 14 10   CREATININE 0.48* 0.50 0.34*   CALCIUM 8.6 8.9 8.6                   Imaging  No orders to display        Assessment/Plan  * C. difficile colitis  Assessment & Plan  -Second episode of C. Difficile  -Not a fulminant episode, WBC is 10, creatinine is normal, albumin 3.6  -Start oral vancomycin 2-week course with taper - start day 4/30/2020  -Serial abdominal exams  -Patient unable to be returned to nursing home ([facility]) with C. difficile    Fracture  Assessment & Plan  -Patient report having a GLF mid March  -Patient following Dr. Dillon - no surgical intervention  -Patient following PT/OT - will continue here while admitted    COPD (chronic obstructive pulmonary disease) ([facility])  Assessment & Plan  -Not in exacerbation  -Albuterol PRN    Chronic respiratory failure with hypoxia ([facility])  Assessment & Plan  -On 2 L of O2 which is her baseline    Essential hypertension  Assessment & Plan  -Uncontrolled  -Continue current home medications  -IV as needed medications have been ordered  -monitor       VTE prophylaxis: SCDs  ---------------------------------------------------------------------------------------------------------------------------------------------------------------------------------------------------------------------------------------------------------------------------------------  Please note that this dictation was created using voice recognition software. I have made every reasonable attempt to correct obvious errors, but there may be errors of grammar and possibly content that I did not discover before finalizing the note.    Electronically signed by:  AR Burris, MSN, APRN, FNP-C  Hospitalist Services  [facility]  [phone]  [email or web address]  05/03/20     [id number]

## 2020-05-03 NOTE — PROGRESS NOTES
Isolation Precautions:    Patient is on Contact isolation for C diff.    Patient educated on reason for isolation, how the infection may be transmitted, and how to help prevent transmission to others.     Patient educated that contact precautions involves staff and visitors wearing PPE, follow  Standard Precautions and perform meticulous hand hygiene in order to prevent transmission of infection.gown and gloves, with the added requirement of soap and water for hand hygiene.     Patient transport and mobilization on unit. Patient educated that they may leave their room, but prior to exiting, the patient needs to have on a fresh patient gown, ensure the potentially infectious area is covered, perform appropriate hand hygiene immediately prior to exiting the room.
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
## 2020-05-03 NOTE — CARE PLAN
Problem: Communication  Goal: The ability to communicate needs accurately and effectively will improve  Outcome: PROGRESSING AS EXPECTED  Patient able to communicate needs. Utilizes call light appropriately.     Problem: Safety  Goal: Will remain free from injury  Outcome: PROGRESSING AS EXPECTED  HFR; bed alarm on/engaged. Room near RN station.      Problem: Venous Thromboembolism (VTW)/Deep Vein Thrombosis (DVT) Prevention:  Goal: Patient will participate in Venous Thrombosis (VTE)/Deep Vein Thrombosis (DVT)Prevention Measures  Outcome: PROGRESSING AS EXPECTED   SCDs on/in place.     Problem: Bowel/Gastric:  Goal: Normal bowel function is maintained or improved  Outcome: PROGRESSING AS EXPECTED  Normoactive BS x 4. Patient tolerating diet.     Problem: Pain Management  Goal: Pain level will decrease to patient's comfort goal  Outcome: PROGRESSING AS EXPECTED  Patient denying pain.      Problem: Mobility  Goal: Risk for activity intolerance will decrease  Outcome: PROGRESSING AS EXPECTED  Patient states doesn't walk, was receiving rehab prior to admission.

## 2020-05-03 NOTE — PROGRESS NOTES
Patient still having some loose stools and incontinence.  Very pleasant.  Potassium replacement given.  Up to chair all day for all meals.

## 2020-05-04 LAB
ALBUMIN SERPL BCP-MCNC: 2.8 G/DL (ref 3.2–4.9)
ALBUMIN/GLOB SERPL: 1.2 G/DL
ALP SERPL-CCNC: 88 U/L (ref 30–99)
ALT SERPL-CCNC: 9 U/L (ref 2–50)
ANION GAP SERPL CALC-SCNC: 6 MMOL/L (ref 7–16)
AST SERPL-CCNC: 9 U/L (ref 12–45)
BILIRUB SERPL-MCNC: 0.3 MG/DL (ref 0.1–1.5)
BUN SERPL-MCNC: 7 MG/DL (ref 8–22)
CALCIUM SERPL-MCNC: 9 MG/DL (ref 8.5–10.5)
CHLORIDE SERPL-SCNC: 96 MMOL/L (ref 96–112)
CO2 SERPL-SCNC: 31 MMOL/L (ref 20–33)
CREAT SERPL-MCNC: 0.39 MG/DL (ref 0.5–1.4)
ERYTHROCYTE [DISTWIDTH] IN BLOOD BY AUTOMATED COUNT: 48.6 FL (ref 35.9–50)
GLOBULIN SER CALC-MCNC: 2.4 G/DL (ref 1.9–3.5)
GLUCOSE SERPL-MCNC: 88 MG/DL (ref 65–99)
HCT VFR BLD AUTO: 37.7 % (ref 37–47)
HGB BLD-MCNC: 12.2 G/DL (ref 12–16)
MCH RBC QN AUTO: 29.2 PG (ref 27–33)
MCHC RBC AUTO-ENTMCNC: 32.4 G/DL (ref 33.6–35)
MCV RBC AUTO: 90.2 FL (ref 81.4–97.8)
PLATELET # BLD AUTO: 167 K/UL (ref 164–446)
PMV BLD AUTO: 9.1 FL (ref 9–12.9)
POTASSIUM SERPL-SCNC: 4 MMOL/L (ref 3.6–5.5)
PROT SERPL-MCNC: 5.2 G/DL (ref 6–8.2)
RBC # BLD AUTO: 4.18 M/UL (ref 4.2–5.4)
SODIUM SERPL-SCNC: 133 MMOL/L (ref 135–145)
WBC # BLD AUTO: 5.2 K/UL (ref 4.8–10.8)

## 2020-05-04 PROCEDURE — A9270 NON-COVERED ITEM OR SERVICE: HCPCS | Performed by: NURSE PRACTITIONER

## 2020-05-04 PROCEDURE — 97535 SELF CARE MNGMENT TRAINING: CPT

## 2020-05-04 PROCEDURE — 302146: Performed by: INTERNAL MEDICINE

## 2020-05-04 PROCEDURE — 80053 COMPREHEN METABOLIC PANEL: CPT

## 2020-05-04 PROCEDURE — 99232 SBSQ HOSP IP/OBS MODERATE 35: CPT | Performed by: INTERNAL MEDICINE

## 2020-05-04 PROCEDURE — A9270 NON-COVERED ITEM OR SERVICE: HCPCS | Performed by: HOSPITALIST

## 2020-05-04 PROCEDURE — 700101 HCHG RX REV CODE 250: Performed by: HOSPITALIST

## 2020-05-04 PROCEDURE — 700105 HCHG RX REV CODE 258: Performed by: NURSE PRACTITIONER

## 2020-05-04 PROCEDURE — A9270 NON-COVERED ITEM OR SERVICE: HCPCS | Performed by: INTERNAL MEDICINE

## 2020-05-04 PROCEDURE — 700102 HCHG RX REV CODE 250 W/ 637 OVERRIDE(OP): Performed by: INTERNAL MEDICINE

## 2020-05-04 PROCEDURE — 700102 HCHG RX REV CODE 250 W/ 637 OVERRIDE(OP): Performed by: NURSE PRACTITIONER

## 2020-05-04 PROCEDURE — 770021 HCHG ROOM/CARE - ISO PRIVATE

## 2020-05-04 PROCEDURE — 36415 COLL VENOUS BLD VENIPUNCTURE: CPT

## 2020-05-04 PROCEDURE — 85027 COMPLETE CBC AUTOMATED: CPT

## 2020-05-04 PROCEDURE — 700102 HCHG RX REV CODE 250 W/ 637 OVERRIDE(OP): Performed by: HOSPITALIST

## 2020-05-04 PROCEDURE — 97530 THERAPEUTIC ACTIVITIES: CPT

## 2020-05-04 RX ORDER — SODIUM CHLORIDE 9 MG/ML
INJECTION, SOLUTION INTRAVENOUS CONTINUOUS
Status: DISCONTINUED | OUTPATIENT
Start: 2020-05-04 | End: 2020-05-05

## 2020-05-04 RX ADMIN — LABETALOL HYDROCHLORIDE 10 MG: 5 INJECTION, SOLUTION INTRAVENOUS at 20:45

## 2020-05-04 RX ADMIN — SODIUM CHLORIDE: 9 INJECTION, SOLUTION INTRAVENOUS at 12:16

## 2020-05-04 RX ADMIN — VANCOMYCIN HYDROCHLORIDE 125 MG: KIT ORAL at 13:11

## 2020-05-04 RX ADMIN — VANCOMYCIN HYDROCHLORIDE 125 MG: KIT ORAL at 18:09

## 2020-05-04 RX ADMIN — VANCOMYCIN HYDROCHLORIDE 125 MG: KIT ORAL at 06:26

## 2020-05-04 RX ADMIN — LISINOPRIL 10 MG: 10 TABLET ORAL at 06:26

## 2020-05-04 RX ADMIN — BISMUTH SUBSALICYLATE 524 MG: 262 LIQUID ORAL at 18:10

## 2020-05-04 RX ADMIN — BISMUTH SUBSALICYLATE 524 MG: 262 LIQUID ORAL at 12:27

## 2020-05-04 ASSESSMENT — COGNITIVE AND FUNCTIONAL STATUS - GENERAL
DAILY ACTIVITIY SCORE: 16
DRESSING REGULAR LOWER BODY CLOTHING: A LOT
SUGGESTED CMS G CODE MODIFIER DAILY ACTIVITY: CK
TOILETING: A LOT
HELP NEEDED FOR BATHING: A LOT
DRESSING REGULAR UPPER BODY CLOTHING: A LITTLE
PERSONAL GROOMING: A LITTLE

## 2020-05-04 ASSESSMENT — ENCOUNTER SYMPTOMS
CHILLS: 0
ABDOMINAL PAIN: 1
DIARRHEA: 1
FALLS: 1
CARDIOVASCULAR NEGATIVE: 1
FEVER: 0
WEAKNESS: 1
EYES NEGATIVE: 1
MYALGIAS: 1
PSYCHIATRIC NEGATIVE: 1
RESPIRATORY NEGATIVE: 1
CONSTITUTIONAL NEGATIVE: 1

## 2020-05-04 NOTE — PROGRESS NOTES
Hospital Medicine Daily Progress Note    Date of Service  5/4/2020    Chief Complaint  85 y.o. female admitted 4/30/2020 with diarrhea    Hospital Course   Ms. Thomason is a 85 y.o. female who presented 4/30/2020 with past medical history of C. difficile, COPD, hypertension comes into the emergency room with diarrhea for 2 days.  Patient states that she is having watery diarrhea as well as foul-smelling.  She states that she would go more than 3 times a day.  She denies any abdominal pain, fever, chills, nausea, vomiting. Patient brought to the hospital found to be hypertensive and tachycardic. Patient admitted for further evaluation and treatment.            Interval Problem Update  -Patient seen and examined. Patient sitting in bed comfortably and eating breatfast. Patient still reports diarrhea episodes, orderd Peptobismol of help with episodes. Patient complains of RIGHT leg tenderness d/t a recent GLF in Mid March. Patient receiving PT/OT. Patient encouraged to utilized recommendations for PT/OT for mobilization. Discuss signs of depression. Patient reports not feeling depressed at this time. Patient aware in plan of care.   -Plan: anticipated 2 weeks oral antibiotics - vancomycin; unable to go back to [facility] until C. Diff is clear; will test for C. Diff once patient is not having diarrhea OR after 2 wks of abx therapy; Peptobismol added.   -Lab work: unremarkable - Na at 133; LR switched to NS at 83mL/hr  -VSS at this time  -Ordered CBC and CMP in am  -No significant changes from previous ROS/PE, please previous note for further details.    Consultants/Specialty  None    Code Status  FULL    Disposition  TBD    Review of Systems  Review of Systems   Constitutional: Negative.  Negative for chills and fever.   HENT: Negative.    Eyes: Negative.    Respiratory: Negative.    Cardiovascular: Negative.    Gastrointestinal: Positive for abdominal pain and diarrhea.   Genitourinary: Negative.     Musculoskeletal: Positive for falls, joint pain and myalgias.   Skin: Negative.    Neurological: Positive for weakness.   Psychiatric/Behavioral: Negative.         Physical Exam  Temp:  [36.3 °C (97.3 °F)-37.2 °C (98.9 °F)] 36.8 °C (98.3 °F)  Pulse:  [75-81] 77  Resp:  [16-17] 16  BP: (142-160)/(68-79) 155/68  SpO2:  [98 %-100 %] 98 %    Physical Exam  HENT:      Head: Normocephalic.      Nose: Nose normal.      Mouth/Throat:      Mouth: Mucous membranes are moist.   Eyes:      Pupils: Pupils are equal, round, and reactive to light.   Neck:      Musculoskeletal: Normal range of motion.   Cardiovascular:      Rate and Rhythm: Normal rate and regular rhythm.      Pulses: Normal pulses.      Heart sounds: Normal heart sounds.   Pulmonary:      Effort: Pulmonary effort is normal.      Breath sounds: Normal breath sounds.   Abdominal:      General: Bowel sounds are normal.      Palpations: Abdomen is soft.      Tenderness: There is abdominal tenderness.   Musculoskeletal:         General: Tenderness, deformity and signs of injury present.      Comments: Hx of GLF; right leg injury mid March   Skin:     General: Skin is warm.      Capillary Refill: Capillary refill takes 2 to 3 seconds.   Neurological:      Mental Status: She is alert. Mental status is at baseline.         Fluids    Intake/Output Summary (Last 24 hours) at 5/4/2020 1037  Last data filed at 5/4/2020 0430  Gross per 24 hour   Intake 1080 ml   Output 0 ml   Net 1080 ml       Laboratory  Recent Labs     05/02/20  0037 05/03/20  0057 05/04/20  0103   WBC 7.5 5.1 5.2   RBC 4.13* 3.84* 4.18*   HEMOGLOBIN 12.0 10.9* 12.2   HEMATOCRIT 36.8* 35.1* 37.7   MCV 89.1 91.4 90.2   MCH 29.1 28.4 29.2   MCHC 32.6* 31.1* 32.4*   RDW 49.1 49.3 48.6   PLATELETCT 158* 160* 167   MPV 9.3 9.6 9.1     Recent Labs     05/02/20  0037 05/03/20  0057 05/04/20  0103   SODIUM 135 138 133*   POTASSIUM 3.3* 4.7 4.0   CHLORIDE 101 103 96   CO2 25 28 31   GLUCOSE 100* 98 88   BUN 14 10  7*   CREATININE 0.50 0.34* 0.39*   CALCIUM 8.9 8.6 9.0                   Imaging  No orders to display        Assessment/Plan  * C. difficile colitis  Assessment & Plan  -Second episode of C. Difficile  -Not a fulminant episode  -Start oral vancomycin 2-week course with taper - start day 4/30/2020  -Serial abdominal exams  -Patient unable to be returned to nursing home ([facility]) with C. difficile    Fracture  Assessment & Plan  -Patient report having a GLF mid March  -Patient following Dr. Dillon - no surgical intervention  -Patient following PT/OT - will continue here while admitted    COPD (chronic obstructive pulmonary disease) ([facility])  Assessment & Plan  -Not in exacerbation  -Albuterol PRN    Chronic respiratory failure with hypoxia ([facility])  Assessment & Plan  -On 2 L of O2 which is her baseline    Essential hypertension  Assessment & Plan  -Uncontrolled  -Continue current home medications  -IV as needed medications have been ordered  -monitor       VTE prophylaxis: SCDs  ---------------------------------------------------------------------------------------------------------------------------------------------------------------------------------------------------------------------------------------------------------------------------------------  Please note that this dictation was created using voice recognition software. I have made every reasonable attempt to correct obvious errors, but there may be errors of grammar and possibly content that I did not discover before finalizing the note.    Electronically signed by:  AR Burris, MSN, APRN, FNP-C  Hospitalist Services  [facility]  [phone]  [email or web address]  05/04/20   104

## 2020-05-04 NOTE — DISCHARGE PLANNING
Care Transition Team Assessment    Anticipated Discharge Disposition: TBD    Action: RN CM verified facesheet demographics via telephone call to patient.  Pt reports she lives at [facility] in [place].  Pt was previously at [facility] and discharged a few days ago. Pt was on service with [facility]  and wishes to resume at discharge. Pt uses assist from the caregivers at the [facility] for her ADLs and IADLs; was previously using a FWW as well. Ptstates she has no prescription drug coverage but is able to afford her medications. Pt's PCP is Denver Miller.      Barriers to Discharge: Medical clearance pending; anticipate 2 weeks of po Vancomycin or negative Cdiff results prior to return to [facility].      Plan: Await medical clearance for discharge back to [facility]; will request order to resume HH when medically cleared.         Information Source  Orientation : Oriented x 4  Information Given By: Patient  Informant's Name: Yokasta Thomason  Who is responsible for making decisions for patient? : Patient    Readmission Evaluation  Is this a readmission?: No    Elopement Risk  Legal Hold: No  Ambulatory or Self Mobile in Wheelchair: Yes  Disoriented: No  Psychiatric Symptoms: None  History of Wandering: No  Elopement this Admit: No  Vocalizing Wanting to Leave: No  Displays Behaviors, Body Language Wanting to Leave: No-Not at Risk for Elopement  Elopement Risk: Not at Risk for Elopement    Interdisciplinary Discharge Planning  Primary Care Physician: Denver Miller, MD  Lives with - Patient's Self Care Capacity: Attendant / Paid Care Giver  Patient or legal guardian wants to designate a caregiver (see row info): No  Support Systems: Children, Home Care Staff  Housing / Facility: Assisted Living Residence([facility])  Name of Care Facility: [facility]  Do You Take your Prescribed Medications Regularly: Yes  Able to Return to Previous ADL's: Yes  Mobility Issues: Yes  Prior Services: Skilled Home Health  Services([facility])  Patient Expects to be Discharged to:: [facility]  Assistance Needed: Yes  Durable Medical Equipment: Walker    Discharge Preparedness  What is your plan after discharge?: Home with help, Home health care  What are your discharge supports?: Child, Other (comment)  Prior Functional Level: Uses Walker, Needs Assist with Medication Management, Needs Assist with Activities of Daily Living, Ambulatory  Difficulity with ADLs: Walking, Dressing, Bathing  Difficulity with IADLs: Laundry, Driving, Cooking, Managing medication    Functional Assesment  Prior Functional Level: Uses Walker, Needs Assist with Medication Management, Needs Assist with Activities of Daily Living, Ambulatory    Finances  Financial Barriers to Discharge: No  Prescription Coverage: No  Prescription Coverage Comments: No Part D coverage but states can afford meds.    Vision / Hearing Impairment  Vision Impairment : No  Hearing Impairment : No  Hearing Impairment: Hearing Device Not Available              Domestic Abuse  Have you ever been the victim of abuse or violence?: No  Physical Abuse or Sexual Abuse: No  Verbal Abuse or Emotional Abuse: No  Possible Abuse Reported to:: Not Applicable         Discharge Risks or Barriers  Discharge risks or barriers?: Uninsured / underinsured  Patient risk factors: Uninsured or underinsured    Anticipated Discharge Information  Anticipated discharge disposition: Assisted living, [facility])  Discharge Address: [address] [place] [address] [place]  Discharge Contact Phone Number: [phone]

## 2020-05-04 NOTE — PROGRESS NOTES
Bedside report received.  Assessment complete.  Contact isolation for Cdiff  A&O x 4. RA. Patient calls appropriately.  Patient ambulates with x1 assist. Bed alarm on.   Patient has 0/10 pain. Pain managed with prescribed medications.  Denies N&V. Tolerating regular diet.  + void, + flatus, 5/3 BM loose. Abdomen is semi firm and distended  Patient denies SOB.  SCD's on. Treaded socks on  Review plan with of care with patient. Call light and personal belongings with in reach. Hourly rounding in place. All needs met at this time.

## 2020-05-05 LAB
ALBUMIN SERPL BCP-MCNC: 2.6 G/DL (ref 3.2–4.9)
ALBUMIN/GLOB SERPL: 1.2 G/DL
ALP SERPL-CCNC: 81 U/L (ref 30–99)
ALT SERPL-CCNC: 7 U/L (ref 2–50)
ANION GAP SERPL CALC-SCNC: 8 MMOL/L (ref 7–16)
AST SERPL-CCNC: 9 U/L (ref 12–45)
BILIRUB SERPL-MCNC: 0.4 MG/DL (ref 0.1–1.5)
BUN SERPL-MCNC: 7 MG/DL (ref 8–22)
CALCIUM SERPL-MCNC: 8.7 MG/DL (ref 8.5–10.5)
CHLORIDE SERPL-SCNC: 98 MMOL/L (ref 96–112)
CO2 SERPL-SCNC: 32 MMOL/L (ref 20–33)
CREAT SERPL-MCNC: 0.37 MG/DL (ref 0.5–1.4)
ERYTHROCYTE [DISTWIDTH] IN BLOOD BY AUTOMATED COUNT: 47.4 FL (ref 35.9–50)
GLOBULIN SER CALC-MCNC: 2.1 G/DL (ref 1.9–3.5)
GLUCOSE SERPL-MCNC: 80 MG/DL (ref 65–99)
HCT VFR BLD AUTO: 34.4 % (ref 37–47)
HGB BLD-MCNC: 11.4 G/DL (ref 12–16)
MCH RBC QN AUTO: 29.3 PG (ref 27–33)
MCHC RBC AUTO-ENTMCNC: 33.1 G/DL (ref 33.6–35)
MCV RBC AUTO: 88.4 FL (ref 81.4–97.8)
PLATELET # BLD AUTO: 158 K/UL (ref 164–446)
PMV BLD AUTO: 9.4 FL (ref 9–12.9)
POTASSIUM SERPL-SCNC: 3.7 MMOL/L (ref 3.6–5.5)
PROT SERPL-MCNC: 4.7 G/DL (ref 6–8.2)
RBC # BLD AUTO: 3.89 M/UL (ref 4.2–5.4)
SODIUM SERPL-SCNC: 138 MMOL/L (ref 135–145)
WBC # BLD AUTO: 4.2 K/UL (ref 4.8–10.8)

## 2020-05-05 PROCEDURE — 700102 HCHG RX REV CODE 250 W/ 637 OVERRIDE(OP): Performed by: INTERNAL MEDICINE

## 2020-05-05 PROCEDURE — 700102 HCHG RX REV CODE 250 W/ 637 OVERRIDE(OP): Performed by: NURSE PRACTITIONER

## 2020-05-05 PROCEDURE — 700105 HCHG RX REV CODE 258: Performed by: NURSE PRACTITIONER

## 2020-05-05 PROCEDURE — 99232 SBSQ HOSP IP/OBS MODERATE 35: CPT | Performed by: INTERNAL MEDICINE

## 2020-05-05 PROCEDURE — 770021 HCHG ROOM/CARE - ISO PRIVATE

## 2020-05-05 PROCEDURE — 80053 COMPREHEN METABOLIC PANEL: CPT

## 2020-05-05 PROCEDURE — 36415 COLL VENOUS BLD VENIPUNCTURE: CPT

## 2020-05-05 PROCEDURE — A9270 NON-COVERED ITEM OR SERVICE: HCPCS | Performed by: NURSE PRACTITIONER

## 2020-05-05 PROCEDURE — A9270 NON-COVERED ITEM OR SERVICE: HCPCS | Performed by: HOSPITALIST

## 2020-05-05 PROCEDURE — 700111 HCHG RX REV CODE 636 W/ 250 OVERRIDE (IP): Performed by: INTERNAL MEDICINE

## 2020-05-05 PROCEDURE — 85027 COMPLETE CBC AUTOMATED: CPT

## 2020-05-05 PROCEDURE — 11721 DEBRIDE NAIL 6 OR MORE: CPT

## 2020-05-05 PROCEDURE — 700102 HCHG RX REV CODE 250 W/ 637 OVERRIDE(OP): Performed by: HOSPITALIST

## 2020-05-05 PROCEDURE — A9270 NON-COVERED ITEM OR SERVICE: HCPCS | Performed by: INTERNAL MEDICINE

## 2020-05-05 RX ADMIN — VANCOMYCIN HYDROCHLORIDE 125 MG: KIT ORAL at 17:17

## 2020-05-05 RX ADMIN — VANCOMYCIN HYDROCHLORIDE 125 MG: KIT ORAL at 23:48

## 2020-05-05 RX ADMIN — ENOXAPARIN SODIUM 40 MG: 100 INJECTION SUBCUTANEOUS at 17:17

## 2020-05-05 RX ADMIN — VANCOMYCIN HYDROCHLORIDE 125 MG: KIT ORAL at 05:57

## 2020-05-05 RX ADMIN — LISINOPRIL 10 MG: 10 TABLET ORAL at 05:57

## 2020-05-05 RX ADMIN — VANCOMYCIN HYDROCHLORIDE 125 MG: KIT ORAL at 13:12

## 2020-05-05 RX ADMIN — BISMUTH SUBSALICYLATE 524 MG: 262 LIQUID ORAL at 05:57

## 2020-05-05 RX ADMIN — VANCOMYCIN HYDROCHLORIDE 125 MG: KIT ORAL at 00:14

## 2020-05-05 RX ADMIN — BISMUTH SUBSALICYLATE 524 MG: 262 LIQUID ORAL at 17:17

## 2020-05-05 RX ADMIN — SODIUM CHLORIDE: 9 INJECTION, SOLUTION INTRAVENOUS at 00:16

## 2020-05-05 ASSESSMENT — ENCOUNTER SYMPTOMS
SHORTNESS OF BREATH: 0
FEVER: 0
DIARRHEA: 1
ABDOMINAL PAIN: 0
VOMITING: 0
COUGH: 0
NAUSEA: 0
DIZZINESS: 0
HEADACHES: 0

## 2020-05-05 NOTE — DISCHARGE PLANNING
Received Choice form at [phone]  Agency/Facility Name: [facility]  Referral sent per Choice form @ [phone]

## 2020-05-05 NOTE — CARE PLAN
Problem: Safety  Goal: Will remain free from injury  Outcome: PROGRESSING AS EXPECTED  Note: Patient free from accidental injury at this time. Safety precautions in place. Bed/chair alarm on. Hourly rounding in place.

## 2020-05-05 NOTE — DISCHARGE PLANNING
Anticipated Discharge Disposition: SNF    Action: RN CM spoke with pt's daughter Lori regarding discharge plan.  Choices for SNF were reviewed with Lori who stated she would [facility]. [facility]ified [facility] currently [facility] no bed today; possibly tomorrow.  [facility] will check for availability in the a.m.; Lori requests to be notified when pt will be transferred.     Barriers to Discharge: SNF bed availability.    Plan: [facility] up with [facility] in the a.m. (5/6/2020).

## 2020-05-05 NOTE — DISCHARGE PLANNING
Agency/Facility Name: [facility]  Spoke To: Krista  Outcome: Patient accepted. Co-pay will be $176/day.    Agency/Facility Name: [facility]  Spoke To: Nilsa  Outcome: Patient declined, not accepting [facility] patients at this time.     Agency/Facility Name: [facility]  Outcome: Notification via Epic, patient declined not able to accept [facility] patients at this time.     Agency/Facility Name: [facility]  Outcome: Notification via Epic, patient accepted.

## 2020-05-05 NOTE — PROGRESS NOTES
Hospital Medicine Daily Progress Note    Date of Service  5/5/2020    Chief Complaint  85 y.o. female admitted 4/30/2020 with diarrhea.    Hospital Course    86 yo female, Hx of C. Diff., COPD on chronic home O2, HTN. Admitted for diarrhea. This is her second bout of C. Diff. Comes from an assisted living home, [facility]. Has a Hx of GLF mid March, saw Dr. Dillon, no surgical intervention, was being followed by PT which is continued here. She was started oral Vanco with taper, anticipated for 2 wks therapy, started on 4/30/2020. Her diarrhea slowly improved. She had increased hypertension and her home lisinopril was increased. Cannot go back to [facility] until negative for C. Diff.      Interval Problem Update  She is eating well. Denies abd pain.Her diarrhea is down to a few times a day. She thinks peptobismol is helping.  She mentions she has ongoing hip pain from prior fracture, is working with therapy    Consultants/Specialty  None    Code Status  Full Code    Disposition  SNF pending    Review of Systems  Review of Systems   Constitutional: Negative for fever.   HENT: Negative for congestion.    Respiratory: Negative for cough and shortness of breath.    Cardiovascular: Negative for chest pain.   Gastrointestinal: Positive for diarrhea. Negative for abdominal pain, nausea and vomiting.   Genitourinary: Negative for dysuria.   Musculoskeletal: Positive for joint pain.   Skin: Negative for itching.   Neurological: Negative for dizziness and headaches.        Physical Exam  Temp:  [36.2 °C (97.1 °F)-37.4 °C (99.3 °F)] 36.4 °C (97.5 °F)  Pulse:  [73-91] 91  Resp:  [16-18] 18  BP: (146-177)/() 155/87  SpO2:  [94 %-96 %] 96 %    Physical Exam  Vitals signs and nursing note reviewed.   Constitutional:       General: She is not in acute distress.     Appearance: She is not toxic-appearing or diaphoretic.      Comments: frail   HENT:      Head: Normocephalic.      Mouth/Throat:      Mouth: Mucous membranes  are moist.   Eyes:      General:         Right eye: No discharge.         Left eye: No discharge.   Cardiovascular:      Rate and Rhythm: Normal rate and regular rhythm.   Pulmonary:      Effort: Pulmonary effort is normal.      Breath sounds: No wheezing or rales.   Abdominal:      General: Abdomen is flat.      Palpations: Abdomen is soft.      Tenderness: There is no abdominal tenderness. There is no guarding or rebound.   Musculoskeletal:         General: No swelling.   Skin:     General: Skin is warm and dry.      Comments: Both feet with thickened dry skin. Right heel with open wound, minimal drainage   Neurological:      Mental Status: She is alert and oriented to person, place, and time.   Psychiatric:         Mood and Affect: Mood normal.         Behavior: Behavior normal.         Fluids    Intake/Output Summary (Last 24 hours) at 5/5/2020 1438  Last data filed at 5/5/2020 1007  Gross per 24 hour   Intake 1975.87 ml   Output 0 ml   Net 1975.87 ml       Laboratory  Recent Labs     05/03/20 0057 05/04/20  0103 05/05/20  0414   WBC 5.1 5.2 4.2*   RBC 3.84* 4.18* 3.89*   HEMOGLOBIN 10.9* 12.2 11.4*   HEMATOCRIT 35.1* 37.7 34.4*   MCV 91.4 90.2 88.4   MCH 28.4 29.2 29.3   MCHC 31.1* 32.4* 33.1*   RDW 49.3 48.6 47.4   PLATELETCT 160* 167 158*   MPV 9.6 9.1 9.4     Recent Labs     05/03/20 0057 05/04/20  0103 05/05/20  0414   SODIUM 138 133* 138   POTASSIUM 4.7 4.0 3.7   CHLORIDE 103 96 98   CO2 28 31 32   GLUCOSE 98 88 80   BUN 10 7* 7*   CREATININE 0.34* 0.39* 0.37*   CALCIUM 8.6 9.0 8.7                   Imaging  No orders to display        Assessment/Plan  * C. difficile colitis  Assessment & Plan  -Second episode of C. Difficile  -Not a fulminant episode  -Start oral vancomycin 2-week course with taper - start day 4/30/2020  -improving  -Patient unable to be returned to nursing home ([facility]) with C. difficile    Fracture  Assessment & Plan  -Patient report having a GLF mid March  -Patient following  Dr. Dillon - no surgical intervention  -Patient following PT/OT - will continue here while admitted    COPD (chronic obstructive pulmonary disease) ([facility])  Assessment & Plan  -Not in exacerbation  -Albuterol PRN    Chronic respiratory failure with hypoxia ([facility])  Assessment & Plan  -On 2 L of O2 which is her baseline    Essential hypertension  Assessment & Plan  -Uncontrolled  -lisinopril was increased  Monitor       VTE prophylaxis: lovenox

## 2020-05-05 NOTE — DISCHARGE PLANNING
Anticipated Discharge Disposition: SNF    Action: Obtained SNF choice; pt authorized referral to all local SNFs. Pt is aware she has 6 Full-SNF days left; would like to know what her copay would be at accepting facilities.     Barriers to Discharge: SNF acceptance pending.    Plan: Await SNF acceptance.

## 2020-05-05 NOTE — DISCHARGE PLANNING
Agency/Facility Name: [facility]  Spoke To: Ricarda  Outcome: Patient accepted. No bed available today. Possible bed tomorrow, will call [facility] in the morning.     [facility]ency[facility]Facility Name: [facility]  Spoke To: Eben  Outcome: Requesting COVID test.     [facility]ency/Facility Name: [facility]  Spoke To: Kristina  Outcome: Requesting COVID test.

## 2020-05-05 NOTE — WOUND TEAM
"[facility]  Inpatient Services  Initial Wound and Skin Care Evaluation    Admission Date: 4/30/2020     Last order of IP CONSULT TO WOUND CARE was found on 5/5/2020 from Hospital Encounter on 4/30/2020       HPI, PMH, SH: Reviewed    Unit where seen by Wound Team: T419/00     WOUND CONSULT/FOLLOW UP RELATED TO:  \"Fungal toes, possible necrosis/growth on right big toe/webbing\"    Self Report / Pain Level:  No c/o pain    OBJECTIVE:  On pressure redistribution mattress with waffle overlay, socks on feet    WOUND TYPE, LOCATION, CHARACTERISTICS (Pressure Injuries: location, stage, POA or date identified)  Wound 05/02/20 Other (comment) Heel Plantar;Medial Right (Active)   Site Assessment Bleeding;Red    Periwound Assessment Other (Comment)    Margins Defined edges    Closure Secondary intention    Drainage Amount Scant    Drainage Description Sanguineous    Treatments Cleansed    Wound Cleansing Normal Saline Irrigation    Periwound Protectant Not Applicable    Dressing Cleansing/Solutions Not Applicable    Dressing Options Gel Gauze;Mepilex Heel    Dressing Changed New    Dressing Status Intact    Dressing Change/Treatment Frequency Every 48 hrs, and As Needed    NEXT Dressing Change/Treatment Date 05/07/20    NEXT Weekly Photo (Inpatient Only) 05/07/20    Non-staged Wound Description Partial thickness    Wound Length (cm) 6.5 cm 5/5/2020  2:00 PM   Wound Width (cm) 2.8 cm 5/5/2020  2:00 PM   Wound Surface Area (cm^2) 18.2 cm^2 5/5/2020  2:00 PM   Shape irregular    Wound Odor None    Pulses DP;1+    Exposed Structures None    Number of days: 3       Vascular:    ARBEN:   No results found.      Lab Values:    Lab Results   Component Value Date/Time    WBC 4.2 (L) 05/05/2020 04:14 AM    RBC 3.89 (L) 05/05/2020 04:14 AM    HEMOGLOBIN 11.4 (L) 05/05/2020 04:14 AM    HEMATOCRIT 34.4 (L) 05/05/2020 04:14 AM          Culture: N/A   Culture Results show:  No results found for this or any previous visit (from " the past 720 hour(s)).      INTERVENTIONS BY WOUND TEAM:  Toes wrapped with soapy, wet washcloths prior to trimming and filing of nails. Trimming and filing completed without difficulty, no knicks or cuts. R heel hyperkeratosis snagged on sock with removal causing bleeding. Cleaned with NS, dried. Applied gel gauze over skin and then secured with heel mepilex.    Interdisciplinary consultation: Patient, Bedside RN     EVALUATION: Pt had  overgrown and mycotic nails.   Hyperkeratosis of skin with probable fungal component to R foot. Gel gauze to moisten and heel mepilex to protect and serve as cover drsg.   Goals: Steady decrease in wound area and depth weekly.    NURSING PLAN OF CARE ORDERS (X):    Dressing changes: See Dressing Care orders: x  Skin care: See Skin Care orders: x  Rectal tube care: See Rectal Tube Care orders:   Other orders:    RSKIN:   CURRENTLY IN PLACE (X), APPLIED THIS VISIT (A), ORDERED (O):  Q shift [place]:  x  Q shift pressure point assessments:    Pressure redistribution mattress  x          Low Airloss          Bariatric DEMETRIO         Bariatric foam           Heel float boots     Heel Silicone dressing   R foot placed as drsg     Float Heels off Bed with Pillows               Barrier wipes         Barrier Cream         Barrier paste          Sacral silicone dressing         Silicone O2 tubing         Anchorfast         Cannula fixation Device (Tender )          Gray Foam Ear protectors           Trach with Optifoam split foam                 Waffle cushion        Waffle Overlay    x    Rectal tube or BMS    Purwick/Condom Cath          Antifungal tx      Interdry          Reposition q 2 hours   x     Up to chair        Ambulate      PT/OT        Dietician        Diabetes Education      PO  x   TF     TPN     NPO   # days   Other        WOUND TEAM PLAN OF CARE   Dressing changes by wound team:          Follow up 1-2 times weekly:               Follow up 3 times weekly:                NPWT  change 3 times weekly:     Follow up as needed:  Nursing to notify wound team if wound deteriorates or fails to progress       Other (explain):     Anticipated discharge plans:  LTACH:        SNF/Rehab:                  Home Care:           Outpatient Wound Center:            Self Care:            Other: unsure if pt will need additional wound care R heel

## 2020-05-05 NOTE — PROGRESS NOTES
Pt is A+Ox4   Denies pain at this time     Tolerating a regular diet   Denies N/V/D     Saturating in the mid 90's on room air  Pt was hypertensive upon arrival     PRN medication given   VSS now     Right foot is heavily calloused and dry/flaking    Wound consult is in    Skin is black/brown under the right great toe     Call light within reach, bed locked and in the lowest position, all needs met at this time   SCD +  on  Special contact isolation for c. diff  Hourly rounding in place

## 2020-05-05 NOTE — PROGRESS NOTES
Bedside report received from NOC RN.   Pt is A&O x4 resting comfortably in bed, transferred to chair.   Pt hard of hearing.  Denies pain, or GI symptoms nausea/ vomiting   - numbness, - tingling.  Wound consult in place. B/L LE have skin color changes and lesions. There is a black wound under her right great toe and on heel.  + BM, + gas  +void, stress incontinence, pt wearing brief.  Regular diet.    Pt pivots to chair x 1. Tolerates well.   Call light and personal items within reach. Fall education reinforced. All needs met at this time. Fall Precautions and hourly rounding in place.

## 2020-05-05 NOTE — PROGRESS NOTES
While patient was up to commode, protrusion noticed from vaginal area. Patient does not complain of pain at site and is able to void. Dr. Christensen notified.  Patient to be observed for changes, if patient unable to void or experiences pain, please notify provider. Continue current plan of care at this time.

## 2020-05-06 VITALS
HEIGHT: 58 IN | TEMPERATURE: 98.4 F | RESPIRATION RATE: 15 BRPM | OXYGEN SATURATION: 96 % | BODY MASS INDEX: 21.61 KG/M2 | DIASTOLIC BLOOD PRESSURE: 77 MMHG | HEART RATE: 75 BPM | SYSTOLIC BLOOD PRESSURE: 157 MMHG | WEIGHT: 102.95 LBS

## 2020-05-06 PROCEDURE — A9270 NON-COVERED ITEM OR SERVICE: HCPCS | Performed by: INTERNAL MEDICINE

## 2020-05-06 PROCEDURE — 700102 HCHG RX REV CODE 250 W/ 637 OVERRIDE(OP): Performed by: INTERNAL MEDICINE

## 2020-05-06 PROCEDURE — 700102 HCHG RX REV CODE 250 W/ 637 OVERRIDE(OP): Performed by: NURSE PRACTITIONER

## 2020-05-06 PROCEDURE — 99239 HOSP IP/OBS DSCHRG MGMT >30: CPT | Performed by: INTERNAL MEDICINE

## 2020-05-06 PROCEDURE — A9270 NON-COVERED ITEM OR SERVICE: HCPCS | Performed by: HOSPITALIST

## 2020-05-06 PROCEDURE — A9270 NON-COVERED ITEM OR SERVICE: HCPCS | Performed by: NURSE PRACTITIONER

## 2020-05-06 PROCEDURE — 700102 HCHG RX REV CODE 250 W/ 637 OVERRIDE(OP): Performed by: HOSPITALIST

## 2020-05-06 PROCEDURE — 700111 HCHG RX REV CODE 636 W/ 250 OVERRIDE (IP): Performed by: INTERNAL MEDICINE

## 2020-05-06 RX ORDER — LISINOPRIL 20 MG/1
20 TABLET ORAL DAILY
Qty: 30 TAB
Start: 2020-05-07 | End: 2021-07-07

## 2020-05-06 RX ORDER — HYDRALAZINE HYDROCHLORIDE 20 MG/ML
10 INJECTION INTRAMUSCULAR; INTRAVENOUS EVERY 6 HOURS PRN
Status: DISCONTINUED | OUTPATIENT
Start: 2020-05-06 | End: 2020-05-06 | Stop reason: HOSPADM

## 2020-05-06 RX ORDER — ACETAMINOPHEN 325 MG/1
650 TABLET ORAL EVERY 6 HOURS PRN
Qty: 30 TAB | Refills: 0
Start: 2020-05-06 | End: 2021-06-03 | Stop reason: SDUPTHER

## 2020-05-06 RX ORDER — LISINOPRIL 20 MG/1
20 TABLET ORAL DAILY
Status: DISCONTINUED | OUTPATIENT
Start: 2020-05-07 | End: 2020-05-06 | Stop reason: HOSPADM

## 2020-05-06 RX ADMIN — ENOXAPARIN SODIUM 40 MG: 100 INJECTION SUBCUTANEOUS at 15:44

## 2020-05-06 RX ADMIN — LABETALOL HYDROCHLORIDE 10 MG: 5 INJECTION, SOLUTION INTRAVENOUS at 13:11

## 2020-05-06 RX ADMIN — VANCOMYCIN HYDROCHLORIDE 125 MG: KIT ORAL at 06:17

## 2020-05-06 RX ADMIN — VANCOMYCIN HYDROCHLORIDE 125 MG: KIT ORAL at 12:43

## 2020-05-06 RX ADMIN — LISINOPRIL 10 MG: 10 TABLET ORAL at 06:17

## 2020-05-06 RX ADMIN — BISMUTH SUBSALICYLATE 524 MG: 262 LIQUID ORAL at 06:17

## 2020-05-06 ASSESSMENT — ENCOUNTER SYMPTOMS
ABDOMINAL PAIN: 0
COUGH: 0
DIARRHEA: 0
HEADACHES: 0
SHORTNESS OF BREATH: 0
DIZZINESS: 0
NAUSEA: 0
FEVER: 0
VOMITING: 0

## 2020-05-06 ASSESSMENT — PATIENT HEALTH QUESTIONNAIRE - PHQ9
SUM OF ALL RESPONSES TO PHQ9 QUESTIONS 1 AND 2: 0
1. LITTLE INTEREST OR PLEASURE IN DOING THINGS: NOT AT ALL
2. FEELING DOWN, DEPRESSED, IRRITABLE, OR HOPELESS: NOT AT ALL

## 2020-05-06 NOTE — DISCHARGE PLANNING
Anticipated Discharge Disposition: SNF    Action: Per [facility], [facility] is able to accept this patient tomorrow 05/07/20 at 12:00pm, MD aware.    PASRR in File # [id number].    Barriers to Discharge: None.    Plan: SNF 05/07/20, pending D/C Summary.

## 2020-05-06 NOTE — DISCHARGE PLANNING
Anticipated Discharge Disposition: SNF    Action: [facility] patient today, MD notified.  Per MD, this patient is medically cleared for transfer to SNF, Discharge Summary provided.    Per [facility], the [facility] to [facility] is scheduled for today at 1700 via [facility].  LSW copied the medical record and completed the [facility] Form.    LSW spoke with the patient and patient's daughter Lori via phone regarding the transfer arrangements, both agree, RN aware.    Barriers to Discharge: None.    Plan: SNF.

## 2020-05-06 NOTE — DISCHARGE SUMMARY
Discharge Summary    CHIEF COMPLAINT ON ADMISSION  Chief Complaint   Patient presents with   • Diarrhea     BIB [facility] for diarrhea x2 days. H/o C. Diff 3 weeks ago. Pt denies pain. States stool is watery.        Reason for Admission  Diarrhea    CODE STATUS  Full Code    HPI & HOSPITAL COURSE   86 yo female, Hx of C. Diff., COPD on chronic home O2, HTN. Admitted for diarrhea. This is her second bout of C. Diff. Comes from an assisted living home, [facility]. She was started oral Vanco with taper, started on 4/30/2020. Her diarrhea slowly improved and now more formed. She cannot go back to [facility] until negative for C. Diff. Likely can be retested about 2 weeks from initiation of oral vanc.     She has a Hx of GLF mid March, saw Dr. Dillon for right tibia fracture, no surgical intervention, was being followed by PT which is continued here.     She was found with persistent hypertension and her home lisinopril was increased.     Therefore, she is discharged in good and stable condition to skilled nursing facility.    The patient met 2-midnight criteria for an inpatient stay at the time of discharge.      FOLLOW UP ITEMS POST DISCHARGE  Continue oral vanc with taper. Retest in 2 weeks from 4/30/20. Once negative can return to [facility] residence.    Continue PT and follow up with Dr. Dillon    Monitor BP and renal function on increased lisinopril    DISCHARGE DIAGNOSES  Principal Problem:    C. difficile colitis POA: Yes  Active Problems:    Essential hypertension POA: Yes    Chronic respiratory failure with hypoxia (HCC) POA: Yes    COPD (chronic obstructive pulmonary disease) (HCC) POA: Yes    Fracture POA: Yes  Resolved Problems:    * No resolved hospital problems. *      FOLLOW UP  [address] [address]  [phone]          MEDICATIONS ON DISCHARGE     Medication List      START taking these medications      Instructions    acetaminophen 325 MG Tabs  Commonly known as:  TYLENOL   Take 2 Tabs by mouth every 6 hours as needed (Mild Pain; (Pain scale 1-3); Temp greater than 100.5 F).  Dose:  650 mg     * vancomycin 50 mg/mL 50 mg/mL Soln   Take 2.5 mL by mouth every 6 hours for 4 days.  Dose:  125 mg     * vancomycin 50 mg/mL 50 mg/mL Soln  Start taking on:  May 10, 2020   Take 2.5 mL by mouth every 12 hours for 7 days.  Dose:  125 mg     * vancomycin 50 mg/mL 50 mg/mL Soln  Start taking on:  May 17, 2020   Take 2.5 mL by mouth every 24 hours for 6 days.  Dose:  125 mg     * vancomycin 50 mg/mL 50 mg/mL Soln  Start taking on:  May 24, 2020   Take 2.5 mL by mouth every 48 hours for 6 days.  Dose:  125 mg     * vancomycin 50 mg/mL 50 mg/mL Soln  Start taking on:  June 1, 2020   Take 2.5 mL by mouth every 72 hours for 6 days.  Dose:  125 mg         * This list has 5 medication(s) that are the same as other medications prescribed for you. Read the directions carefully, and ask your doctor or other care provider to review them with you.            CHANGE how you take these medications      Instructions   lisinopril 20 MG Tabs  Start taking on:  May 7, 2020  What changed:    · medication strength  · how much to take  Commonly known as:  PRINIVIL   Take 1 Tab by mouth every day.  Dose:  20 mg        CONTINUE taking these medications      Instructions   PROBIOTIC PO   Take 1 Tab by mouth 2 Times a Day.  Dose:  1 Tab            Allergies  No Known Allergies    DIET  Orders Placed This Encounter   Procedures   • Diet Order Regular     Standing Status:   Standing     Number of Occurrences:   1     Order Specific Question:   Diet:     Answer:   Regular [1]       ACTIVITY  As tolerated.  Weight bearing as tolerated    LINES, DRAINS, AND WOUNDS  This is an automated list. Peripheral IVs will be removed prior to discharge.  Peripheral IV 04/30/20 18 G Right Forearm (Active)   Site Assessment Clean;Dry;Intact 5/6/2020  8:10 AM   Dressing Type  Transparent 5/6/2020  8:10 AM   Line Status Saline locked 5/6/2020  8:10 AM   Dressing Status Clean;Dry;Intact;Old drainage 5/6/2020  8:10 AM   Dressing Intervention N/A 5/5/2020 11:00 PM   Date Primary Tubing Changed 05/02/20 5/3/2020 11:00 PM   NEXT Primary Tubing Change  05/09/20 5/4/2020  8:45 PM   Infiltration Grading [facility] 0 5/6/2020  8:10 AM   Phlebitis Scale ([facility] Only) 0 5/6/2020  8:10 AM       Wound 05/02/20 Other (comment) Heel Plantar;Medial Right (Active)   Site Assessment BRADY 5/6/2020  8:20 AM   Periwound Assessment BRADY 5/6/2020  8:20 AM   Margins BRADY 5/6/2020  8:20 AM   Closure BRADY 5/6/2020  8:20 AM   Drainage Amount None 5/6/2020  8:20 AM   Drainage Description Sanguineous 5/5/2020  9:00 PM   Treatments Cleansed 5/5/2020  2:00 PM   Wound Cleansing Not Applicable 5/5/2020  9:00 PM   Periwound Protectant Not Applicable 5/5/2020  9:00 PM   Dressing Cleansing/Solutions Not Applicable 5/5/2020  9:00 PM   Dressing Options Gel Gauze;Mepilex Heel 5/6/2020  8:20 AM   Dressing Changed Observed 5/5/2020  9:00 PM   Dressing Status Clean;Dry;Intact 5/6/2020  8:20 AM   Dressing Change/Treatment Frequency Every 48 hrs, and As Needed 5/6/2020  8:20 AM   NEXT Dressing Change/Treatment Date 05/07/20 5/6/2020  8:20 AM   NEXT Weekly Photo (Inpatient Only) 05/07/20 5/5/2020  2:00 PM   Non-staged Wound Description Partial thickness 5/5/2020  2:00 PM   Wound Length (cm) 6.5 cm 5/5/2020  2:00 PM   Wound Width (cm) 2.8 cm 5/5/2020  2:00 PM   Wound Surface Area (cm^2) 18.2 cm^2 5/5/2020  2:00 PM   Shape irregular 5/5/2020  2:00 PM   Wound Odor None 5/5/2020  2:00 PM   Pulses DP;1+ 5/5/2020  2:00 PM   Exposed Structures None 5/5/2020  2:00 PM   WOUND NURSE ONLY - Time Spent with Patient (mins) 75 5/5/2020  2:00 PM       Peripheral IV 04/30/20 18 G Right Forearm (Active)   Site Assessment Clean;Dry;Intact 5/6/2020  8:10 AM   Dressing Type Transparent 5/6/2020  8:10 AM   Line Status Saline locked 5/6/2020  8:10 AM    Dressing Status Clean;Dry;Intact;Old drainage 5/6/2020  8:10 AM   Dressing Intervention N/A 5/5/2020 11:00 PM   Date Primary Tubing Changed 05/02/20 5/3/2020 11:00 PM   NEXT Primary Tubing Change  05/09/20 5/4/2020  8:45 PM   Infiltration Grading ([facility] 0 5/6/2020  8:10 AM   Phlebitis Scale ([facility] Only) 0 5/6/2020  8:10 AM               MENTAL STATUS ON TRANSFER  Level of Consciousness: Alert  Orientation : Oriented x 4  Speech: Speech Clear    CONSULTATIONS  None    PROCEDURES  No orders to display         LABORATORY  Lab Results   Component Value Date    SODIUM 138 05/05/2020    POTASSIUM 3.7 05/05/2020    CHLORIDE 98 05/05/2020    CO2 32 05/05/2020    GLUCOSE 80 05/05/2020    BUN 7 (L) 05/05/2020    CREATININE 0.37 (L) 05/05/2020        Lab Results   Component Value Date    WBC 4.2 (L) 05/05/2020    HEMOGLOBIN 11.4 (L) 05/05/2020    HEMATOCRIT 34.4 (L) 05/05/2020    PLATELETCT 158 (L) 05/05/2020        Total time of the discharge process exceeds 32 minutes.

## 2020-05-06 NOTE — DISCHARGE INSTRUCTIONS
Discharge Instructions    Discharged to other by medical transportation with escort. Discharged via wheelchair, hospital escort: Yes.  Special equipment needed: Not Applicable    Be sure to schedule a follow-up appointment with your primary care doctor or any specialists as instructed.     Discharge Plan:   Diet Plan: Discussed  Activity Level: Discussed  Confirmed Follow up Appointment: Patient to Call and Schedule Appointment  Confirmed Symptoms Management: Discussed  Medication Reconciliation Updated: Yes    I understand that a diet low in cholesterol, fat, and sodium is recommended for good health. Unless I have been given specific instructions below for another diet, I accept this instruction as my diet prescription.   Other diet: Regular diet as tolerated.    Special Instructions: None    · Is patient discharged on Warfarin / Coumadin?   No     Clostridium Difficile Infection  Introduction   Clostridium difficile (C. difficile or C. diff) infection causes inflammation of the large intestine (colon). This condition can result in damage to the lining of your colon and may lead to another condition called colitis. This infection can be passed from person to person (is contagious).  Follow these instructions at home:  Eating and drinking  · Drink enough fluid to keep your pee (urine) clear or pale yellow.  · Avoid drinking:  ¨ Milk.  ¨ Caffeine.  ¨ Alcohol.  · Follow exact instructions from your doctor about how to get enough fluid in your body (rehydrate).  · Eat small meals often instead of large meals.  Medicines  · Take your antibiotic medicine as told by your doctor. Do not stop taking the antibiotic even if you start to feel better unless your doctor told you to do that.  · Take over-the-counter and prescription medicines only as told by your doctor.  · Do not use medicines to help with watery poop (diarrhea).  General instructions  · Wash your hands fully before you prepare food and after you use the  bathroom. Make sure people who live with you also wash their  · hands often.  · Clean the surfaces that you touch. Use a product that contains chlorine bleach.  · Keep all follow-up visits as told by your doctor. This is important.  Contact a doctor if:  · Your symptoms do not get better with treatment.  · Your symptoms get worse with treatment.  · Your symptoms go away and then come back.  · You have a fever.  · You have new symptoms.  Get help right away if:  · You have more pain or tenderness in your belly (abdomen).  · Your poop (stool) is mostly bloody.  · Your poop looks dark black and tarry.  · You cannot eat or drink without throwing up (vomiting).  · You have signs of dehydration, such as:  ¨ Dark pee, very little pee, or no pee.  ¨ Cracked lips.  ¨ Not making tears when you cry.  ¨ Dry mouth.  ¨ Sunken eyes.  ¨ Feeling sleepy.  ¨ Feeling weak.  ¨ Feeling dizzy.  This information is not intended to replace advice given to you by your health care provider. Make sure you discuss any questions you have with your health care provider.  Document Released: 10/15/2010 Document Revised: 05/25/2017 Document Reviewed: 06/20/2016  © 2017 Elsevier      Depression / Suicide Risk    As you are discharged from this [facility] facility, it is important to learn how to keep safe from harming yourself.    Recognize the warning signs:  · Abrupt changes in personality, positive or negative- including increase in energy   · Giving away possessions  · Change in eating patterns- significant weight changes-  positive or negative  · Change in sleeping patterns- unable to sleep or sleeping all the time   · Unwillingness or inability to communicate  · Depression  · Unusual sadness, discouragement and loneliness  · Talk of wanting to die  · Neglect of personal appearance   · Rebelliousness- reckless behavior  · Withdrawal from people/activities they love  · Confusion- inability to concentrate     If you or a loved one observes any  of these behaviors or has concerns about self-harm, here's what you can do:  · Talk about it- your feelings and reasons for harming yourself  · Remove any means that you might use to hurt yourself (examples: pills, rope, extension cords, firearm)  · Get professional help from the community (Mental Health, Substance Abuse, psychological counseling)  · Do not be alone:Call your Safe Contact- someone whom you trust who will be there for you.  · Call your local CRISIS HOTLINE 562-5910 or 199-990-7049  · Call your local Children's Mobile Crisis Response Team [place] (198) 344-8816 or www.YesGraph  · Call the toll free National Suicide Prevention Hotlines   · National Suicide Prevention Lifeline 446-730-QBPX (1170)  · National Hope Line Network 800-SUICIDE (056-0599)

## 2020-05-06 NOTE — PROGRESS NOTES
Hospital Medicine Daily Progress Note    Date of Service  5/6/2020    Chief Complaint  85 y.o. female admitted 4/30/2020 with diarrhea.    Hospital Course    84 yo female, Hx of C. Diff., COPD on chronic home O2, HTN. Admitted for diarrhea. This is her second bout of C. Diff. Comes from an assisted living home, [facility]. She was started oral Vanco with taper, anticipated for 2 wks therapy, started on 4/30/2020. Her diarrhea slowly improved. She had increased hypertension and her home lisinopril was increased. Cannot go back to [facility] until negative for C. Diff. Has a Hx of GLF mid March, saw Dr. Dillon for right tibia fracture, no surgical intervention, was being followed by PT which is continued here. She was found with persistent hypertension and her home lisinopril was increased.      Interval Problem Update  Stool is more formed. She's eating well. No abd pain or nausea.    Consultants/Specialty  None    Code Status  Full Code    Disposition  SNF pending    Review of Systems  Review of Systems   Constitutional: Negative for fever.   HENT: Negative for congestion.    Respiratory: Negative for cough and shortness of breath.    Cardiovascular: Negative for chest pain.   Gastrointestinal: Negative for abdominal pain, diarrhea, nausea and vomiting.   Genitourinary: Negative for dysuria.   Musculoskeletal: Positive for joint pain.   Skin: Negative for itching.   Neurological: Negative for dizziness and headaches.        Physical Exam  Temp:  [36.3 °C (97.3 °F)-36.9 °C (98.4 °F)] 36.9 °C (98.4 °F)  Pulse:  [] 75  Resp:  [15-18] 15  BP: (141-166)/(76-92) 166/77  SpO2:  [92 %-98 %] 96 %    Physical Exam  Vitals signs and nursing note reviewed.   Constitutional:       General: She is not in acute distress.     Appearance: She is not toxic-appearing or diaphoretic.      Comments: frail   HENT:      Head: Normocephalic.      Mouth/Throat:      Mouth: Mucous membranes are moist.   Eyes:      General:          Right eye: No discharge.         Left eye: No discharge.   Cardiovascular:      Rate and Rhythm: Normal rate and regular rhythm.   Pulmonary:      Effort: Pulmonary effort is normal.      Breath sounds: No wheezing or rales.   Abdominal:      Palpations: Abdomen is soft.      Tenderness: There is no abdominal tenderness. There is no guarding or rebound.   Musculoskeletal:      Right lower leg: No edema.      Left lower leg: No edema.   Skin:     General: Skin is warm and dry.      Comments: Both feet with thickened dry skin. Right heel with open wound, minimal drainage   Neurological:      Mental Status: She is alert and oriented to person, place, and time.   Psychiatric:         Mood and Affect: Mood normal.         Behavior: Behavior normal.         Fluids    Intake/Output Summary (Last 24 hours) at 5/6/2020 1322  Last data filed at 5/6/2020 1200  Gross per 24 hour   Intake 840 ml   Output --   Net 840 ml       Laboratory  Recent Labs     05/04/20  0103 05/05/20  0414   WBC 5.2 4.2*   RBC 4.18* 3.89*   HEMOGLOBIN 12.2 11.4*   HEMATOCRIT 37.7 34.4*   MCV 90.2 88.4   MCH 29.2 29.3   MCHC 32.4* 33.1*   RDW 48.6 47.4   PLATELETCT 167 158*   MPV 9.1 9.4     Recent Labs     05/04/20  0103 05/05/20  0414   SODIUM 133* 138   POTASSIUM 4.0 3.7   CHLORIDE 96 98   CO2 31 32   GLUCOSE 88 80   BUN 7* 7*   CREATININE 0.39* 0.37*   CALCIUM 9.0 8.7                   Imaging  No orders to display        Assessment/Plan  * C. difficile colitis  Assessment & Plan  -Second episode of C. Difficile  -Not a fulminant episode  -Start oral vancomycin 2-week course with taper - start day 4/30/2020  -improving  -Patient unable to be returned to nursing home ([facility]) with C. difficile    Fracture  Assessment & Plan  -Patient report having a GLF mid March with right tibia fracture  -Patient following Dr. Dillon - no surgical intervention  -Patient following PT/OT - will continue here while admitted    COPD (chronic obstructive pulmonary  disease) (HCC)  Assessment & Plan  -Not in exacerbation  -Albuterol PRN    Chronic respiratory failure with hypoxia (HCC)  Assessment & Plan  -On 2 L of O2 which is her baseline    Essential hypertension  Assessment & Plan  Remains hypertensive, increase lisinopril and monitor BMP  IV hydralazine PRN       VTE prophylaxis: lovenox

## 2020-05-06 NOTE — DISCHARGE PLANNING
Agency/Facility Name: [facility]   Spoke to: Ricarda  Outcome: Possible bed today, will update CCA.

## 2020-05-06 NOTE — CARE PLAN
Problem: Safety  Goal: Will remain free from injury  Outcome: PROGRESSING AS EXPECTED  Goal: Will remain free from falls  Outcome: PROGRESSING AS EXPECTED     Problem: Bowel/Gastric:  Goal: Normal bowel function is maintained or improved  Outcome: PROGRESSING AS EXPECTED  Goal: Will not experience complications related to bowel motility  Outcome: PROGRESSING AS EXPECTED     Problem: Mobility  Goal: Risk for activity intolerance will decrease  Outcome: PROGRESSING AS EXPECTED

## 2020-05-06 NOTE — PROGRESS NOTES
Bedside report received.  Assessment complete.  Contact isolation for Cdiff  Pt hard of hearing. No hearing devices available  A&O x 4. 1/2 L NC. Pt does not wear home O2. Sating 91-94% Patient calls appropriately.  Patient ambulates with x1 assist. Only able to pivot to commode d/t RLE fx from previous GLF. Bed alarm on.   Patient has 0/10 pain. Pain managed with prescribed medications.  Bilateral feet noted to have fungal growth and hyperkeratosis. Wound following. Heel mepilex in place.   Denies N&V. Tolerating regular diet.  + void, + flatus, 5/5 BM   Patient denies SOB.  SCD's on. Treaded socks on  Review plan with of care with patient. Call light and personal belongings with in reach. Hourly rounding in place. All needs met at this time.

## 2020-05-06 NOTE — DISCHARGE PLANNING
Agency/Facility Name: [facility]  Spoke To: Ricarda  Outcome: No bed today. Bed will be available for patient tomorrow, 5/7. Facility wheelchair van to  patient tomorrow, 5/7 at 1200.    ANTONIA Zacarias notified.

## 2020-05-06 NOTE — DISCHARGE PLANNING
Agency/Facility Name: [facility]  Spoke To: Ricarda  Outcome: Bed became available today for patient. Facility wheelchair van able to  patient at 1700.    ANTONIA adam.

## 2020-05-07 NOTE — PROGRESS NOTES
Received order for discharge.  Discharge instructions reviewed with pt.  PIV removed.  Pt picked up by [facility] transport personnel.  All belongings left with pt.

## 2021-01-14 DIAGNOSIS — Z23 NEED FOR VACCINATION: ICD-10-CM

## 2021-03-11 NOTE — THERAPY
"Occupational Therapy Treatment completed with focus on ADLs, ADL transfers and patient education.  Functional Status:  Supine > EOB min A, pivot transfers with FWW min A, LB dressing mod A, seated grooming min A  Plan of Care: Will benefit from Occupational Therapy 4 times per week  Discharge Recommendations:  Equipment Will Continue to Assess for Equipment Needs. Post-acute therapy: Recommend post-acute placement for additional occupational therapy services prior to discharge home.    See \"Rehab Therapy-Acute\" Patient Summary Report for complete documentation.     Pt seen for OT tx to include: bed mobility, transfers with FWW, LB dressing, seated grooming. Very pleasant & cooperative. Per chart, still no clarification of WB status to RLE, so treated as NWB (pt demonstrated TTWB). Pt required balancing assist in standing. Pt has extremely long toe nails as well as scaly, dead skin built-up on B feet. RN informed. Required assist with socks due to snagging on toe nails. Pt is limited by: generalized weakness, balance impairment, activity intolerance, questionable WB status. Will continue to benefit from acute OT to maximize functional independence and safety.   " Dr Hardyn Him asking if appt can be setup for video visit. Nicol Baxter will discuss with daughter.

## 2021-07-07 PROBLEM — E87.1 HYPONATREMIA: Status: ACTIVE | Noted: 2021-07-07

## 2021-07-07 PROBLEM — T14.8XXA FRACTURE: Status: RESOLVED | Noted: 2020-05-03 | Resolved: 2021-07-07

## 2021-07-07 PROBLEM — M19.90 CHRONIC OSTEOARTHRITIS: Status: ACTIVE | Noted: 2021-07-07

## 2021-07-07 PROBLEM — Z66 DNR (DO NOT RESUSCITATE): Status: ACTIVE | Noted: 2021-07-07

## 2021-07-07 PROBLEM — A04.72 C. DIFFICILE COLITIS: Status: RESOLVED | Noted: 2020-05-01 | Resolved: 2021-07-07

## 2021-07-07 PROBLEM — E78.5 DYSLIPIDEMIA: Status: ACTIVE | Noted: 2021-07-07

## 2021-07-07 PROBLEM — Z74.09 IMPAIRED FUNCTIONAL MOBILITY, BALANCE, GAIT, AND ENDURANCE: Status: ACTIVE | Noted: 2021-07-07

## 2021-07-07 PROBLEM — E55.9 VITAMIN D DEFICIENCY: Status: ACTIVE | Noted: 2021-07-07

## 2021-08-02 PROBLEM — I10 ESSENTIAL HYPERTENSION: Chronic | Status: ACTIVE | Noted: 2020-05-01

## 2021-08-02 PROBLEM — E55.9 VITAMIN D DEFICIENCY: Chronic | Status: ACTIVE | Noted: 2021-07-07

## 2021-08-02 PROBLEM — Z86.19 HISTORY OF CLOSTRIDIUM DIFFICILE INFECTION: Chronic | Status: ACTIVE | Noted: 2021-07-12

## 2021-08-02 PROBLEM — Z86.19 HISTORY OF CLOSTRIDIUM DIFFICILE INFECTION: Status: ACTIVE | Noted: 2021-07-12

## 2021-08-02 PROBLEM — E78.5 DYSLIPIDEMIA: Chronic | Status: ACTIVE | Noted: 2021-07-07

## 2021-08-02 PROBLEM — E87.1 HYPONATREMIA: Chronic | Status: ACTIVE | Noted: 2021-07-07

## 2021-08-02 PROBLEM — J44.9 COPD (CHRONIC OBSTRUCTIVE PULMONARY DISEASE) (HCC): Chronic | Status: ACTIVE | Noted: 2020-05-01

## 2021-08-02 PROBLEM — Z74.09 IMPAIRED FUNCTIONAL MOBILITY, BALANCE, GAIT, AND ENDURANCE: Chronic | Status: ACTIVE | Noted: 2021-07-07

## 2021-08-23 PROBLEM — E87.1 HYPONATREMIA: Chronic | Status: RESOLVED | Noted: 2021-07-07 | Resolved: 2021-08-23

## 2021-08-23 PROBLEM — J96.11 CHRONIC RESPIRATORY FAILURE WITH HYPOXIA (HCC): Chronic | Status: ACTIVE | Noted: 2020-05-01

## 2021-10-19 PROBLEM — J96.11 CHRONIC RESPIRATORY FAILURE WITH HYPOXIA (HCC): Chronic | Status: RESOLVED | Noted: 2020-05-01 | Resolved: 2021-10-19

## 2021-10-19 PROBLEM — I73.9 PVD (PERIPHERAL VASCULAR DISEASE) (HCC): Status: ACTIVE | Noted: 2021-10-19

## 2021-11-07 PROBLEM — I73.9 PVD (PERIPHERAL VASCULAR DISEASE) (HCC): Chronic | Status: ACTIVE | Noted: 2021-10-19

## 2022-08-15 ENCOUNTER — HOSPITAL ENCOUNTER (OUTPATIENT)
Facility: MEDICAL CENTER | Age: 87
End: 2022-08-15
Attending: NURSE PRACTITIONER
Payer: MEDICARE

## 2022-08-15 DIAGNOSIS — Z79.899 HIGH RISK MEDICATION USE: ICD-10-CM

## 2022-08-15 DIAGNOSIS — E55.9 VITAMIN D DEFICIENCY: ICD-10-CM

## 2022-08-15 DIAGNOSIS — I10 ESSENTIAL HYPERTENSION: ICD-10-CM

## 2022-08-15 DIAGNOSIS — E78.5 DYSLIPIDEMIA: ICD-10-CM

## 2022-08-15 PROCEDURE — 80061 LIPID PANEL: CPT

## 2022-08-15 PROCEDURE — 85025 COMPLETE CBC W/AUTO DIFF WBC: CPT

## 2022-08-15 PROCEDURE — 82746 ASSAY OF FOLIC ACID SERUM: CPT

## 2022-08-15 PROCEDURE — 83036 HEMOGLOBIN GLYCOSYLATED A1C: CPT | Mod: GA

## 2022-08-15 PROCEDURE — 84443 ASSAY THYROID STIM HORMONE: CPT

## 2022-08-15 PROCEDURE — 82306 VITAMIN D 25 HYDROXY: CPT

## 2022-08-15 PROCEDURE — 82607 VITAMIN B-12: CPT

## 2022-08-15 PROCEDURE — 80053 COMPREHEN METABOLIC PANEL: CPT

## 2022-08-15 PROCEDURE — 81001 URINALYSIS AUTO W/SCOPE: CPT

## 2022-08-16 LAB
25(OH)D3 SERPL-MCNC: 32 NG/ML (ref 30–100)
ALBUMIN SERPL BCP-MCNC: 4.4 G/DL (ref 3.2–4.9)
ALBUMIN/GLOB SERPL: 1.8 G/DL
ALP SERPL-CCNC: 92 U/L (ref 30–99)
ALT SERPL-CCNC: 9 U/L (ref 2–50)
ANION GAP SERPL CALC-SCNC: 12 MMOL/L (ref 7–16)
APPEARANCE UR: CLEAR
AST SERPL-CCNC: 17 U/L (ref 12–45)
BACTERIA #/AREA URNS HPF: ABNORMAL /HPF
BASOPHILS # BLD AUTO: 0.9 % (ref 0–1.8)
BASOPHILS # BLD: 0.03 K/UL (ref 0–0.12)
BILIRUB SERPL-MCNC: 0.7 MG/DL (ref 0.1–1.5)
BILIRUB UR QL STRIP.AUTO: NEGATIVE
BUN SERPL-MCNC: 12 MG/DL (ref 8–22)
CALCIUM SERPL-MCNC: 9.9 MG/DL (ref 8.5–10.5)
CHLORIDE SERPL-SCNC: 97 MMOL/L (ref 96–112)
CHOLEST SERPL-MCNC: 273 MG/DL (ref 100–199)
CO2 SERPL-SCNC: 25 MMOL/L (ref 20–33)
COLOR UR: YELLOW
CREAT SERPL-MCNC: 0.61 MG/DL (ref 0.5–1.4)
EOSINOPHIL # BLD AUTO: 0.02 K/UL (ref 0–0.51)
EOSINOPHIL NFR BLD: 0.6 % (ref 0–6.9)
EPI CELLS #/AREA URNS HPF: NEGATIVE /HPF
ERYTHROCYTE [DISTWIDTH] IN BLOOD BY AUTOMATED COUNT: 47.7 FL (ref 35.9–50)
EST. AVERAGE GLUCOSE BLD GHB EST-MCNC: 105 MG/DL
FOLATE SERPL-MCNC: 18.2 NG/ML
GFR SERPLBLD CREATININE-BSD FMLA CKD-EPI: 87 ML/MIN/1.73 M 2
GLOBULIN SER CALC-MCNC: 2.4 G/DL (ref 1.9–3.5)
GLUCOSE SERPL-MCNC: 81 MG/DL (ref 65–99)
GLUCOSE UR STRIP.AUTO-MCNC: NEGATIVE MG/DL
HBA1C MFR BLD: 5.3 % (ref 4–5.6)
HCT VFR BLD AUTO: 43.9 % (ref 37–47)
HDLC SERPL-MCNC: 111 MG/DL
HGB BLD-MCNC: 14.2 G/DL (ref 12–16)
HYALINE CASTS #/AREA URNS LPF: ABNORMAL /LPF
IMM GRANULOCYTES # BLD AUTO: 0.01 K/UL (ref 0–0.11)
IMM GRANULOCYTES NFR BLD AUTO: 0.3 % (ref 0–0.9)
KETONES UR STRIP.AUTO-MCNC: NEGATIVE MG/DL
LDLC SERPL CALC-MCNC: 151 MG/DL
LEUKOCYTE ESTERASE UR QL STRIP.AUTO: ABNORMAL
LYMPHOCYTES # BLD AUTO: 0.75 K/UL (ref 1–4.8)
LYMPHOCYTES NFR BLD: 22.3 % (ref 22–41)
MCH RBC QN AUTO: 29.9 PG (ref 27–33)
MCHC RBC AUTO-ENTMCNC: 32.3 G/DL (ref 33.6–35)
MCV RBC AUTO: 92.4 FL (ref 81.4–97.8)
MICRO URNS: ABNORMAL
MONOCYTES # BLD AUTO: 0.43 K/UL (ref 0–0.85)
MONOCYTES NFR BLD AUTO: 12.8 % (ref 0–13.4)
NEUTROPHILS # BLD AUTO: 2.12 K/UL (ref 2–7.15)
NEUTROPHILS NFR BLD: 63.1 % (ref 44–72)
NITRITE UR QL STRIP.AUTO: NEGATIVE
NRBC # BLD AUTO: 0 K/UL
NRBC BLD-RTO: 0 /100 WBC
PH UR STRIP.AUTO: 6.5 [PH] (ref 5–8)
PLATELET # BLD AUTO: 202 K/UL (ref 164–446)
PMV BLD AUTO: 9.8 FL (ref 9–12.9)
POTASSIUM SERPL-SCNC: 5.2 MMOL/L (ref 3.6–5.5)
PROT SERPL-MCNC: 6.8 G/DL (ref 6–8.2)
PROT UR QL STRIP: NEGATIVE MG/DL
RBC # BLD AUTO: 4.75 M/UL (ref 4.2–5.4)
RBC # URNS HPF: ABNORMAL /HPF
RBC UR QL AUTO: NEGATIVE
SODIUM SERPL-SCNC: 134 MMOL/L (ref 135–145)
SP GR UR STRIP.AUTO: 1.01
TRIGL SERPL-MCNC: 56 MG/DL (ref 0–149)
TSH SERPL DL<=0.005 MIU/L-ACNC: 1.67 UIU/ML (ref 0.38–5.33)
UROBILINOGEN UR STRIP.AUTO-MCNC: 0.2 MG/DL
VIT B12 SERPL-MCNC: 514 PG/ML (ref 211–911)
WBC # BLD AUTO: 3.4 K/UL (ref 4.8–10.8)
WBC #/AREA URNS HPF: ABNORMAL /HPF

## 2022-10-26 PROBLEM — I50.9 ACUTE CHF (CONGESTIVE HEART FAILURE) (HCC): Status: ACTIVE | Noted: 2022-10-26

## 2022-10-26 PROBLEM — J96.01 ACUTE RESPIRATORY FAILURE WITH HYPOXIA (HCC): Chronic | Status: ACTIVE | Noted: 2022-10-26

## 2022-10-26 PROBLEM — J96.01 ACUTE RESPIRATORY FAILURE WITH HYPOXIA (HCC): Status: ACTIVE | Noted: 2022-10-26

## 2022-10-26 PROBLEM — I50.9 ACUTE CHF (CONGESTIVE HEART FAILURE) (HCC): Chronic | Status: ACTIVE | Noted: 2022-10-26
